# Patient Record
Sex: MALE | Race: WHITE | NOT HISPANIC OR LATINO | Employment: OTHER | URBAN - METROPOLITAN AREA
[De-identification: names, ages, dates, MRNs, and addresses within clinical notes are randomized per-mention and may not be internally consistent; named-entity substitution may affect disease eponyms.]

---

## 2019-06-12 ENCOUNTER — OFFICE VISIT (OUTPATIENT)
Dept: PODIATRY | Facility: CLINIC | Age: 74
End: 2019-06-12
Payer: MEDICARE

## 2019-06-12 VITALS
RESPIRATION RATE: 18 BRPM | OXYGEN SATURATION: 96 % | HEIGHT: 70 IN | TEMPERATURE: 98 F | HEART RATE: 61 BPM | DIASTOLIC BLOOD PRESSURE: 74 MMHG | BODY MASS INDEX: 26.2 KG/M2 | WEIGHT: 183 LBS | SYSTOLIC BLOOD PRESSURE: 121 MMHG

## 2019-06-12 DIAGNOSIS — L03.031 PARONYCHIA OF GREAT TOE OF RIGHT FOOT: ICD-10-CM

## 2019-06-12 DIAGNOSIS — L60.0 INGROWN NAIL: Primary | ICD-10-CM

## 2019-06-12 PROCEDURE — 99999 PR PBB SHADOW E&M-NEW PATIENT-LVL III: CPT | Mod: PBBFAC,,, | Performed by: PODIATRIST

## 2019-06-12 PROCEDURE — 99202 PR OFFICE/OUTPT VISIT, NEW, LEVL II, 15-29 MIN: ICD-10-PCS | Mod: S$PBB,,, | Performed by: PODIATRIST

## 2019-06-12 PROCEDURE — 99202 OFFICE O/P NEW SF 15 MIN: CPT | Mod: S$PBB,,, | Performed by: PODIATRIST

## 2019-06-12 PROCEDURE — 99999 PR PBB SHADOW E&M-NEW PATIENT-LVL III: ICD-10-PCS | Mod: PBBFAC,,, | Performed by: PODIATRIST

## 2019-06-12 PROCEDURE — 99203 OFFICE O/P NEW LOW 30 MIN: CPT | Mod: PBBFAC | Performed by: PODIATRIST

## 2019-06-12 RX ORDER — TELMISARTAN 40 MG/1
TABLET ORAL
Refills: 3 | COMMUNITY
Start: 2019-04-26

## 2019-06-12 RX ORDER — LATANOPROST 50 UG/ML
SOLUTION/ DROPS OPHTHALMIC
Refills: 3 | COMMUNITY
Start: 2019-05-29

## 2019-06-12 RX ORDER — CITALOPRAM 20 MG/1
20 TABLET, FILM COATED ORAL DAILY
Refills: 3 | COMMUNITY
Start: 2019-05-27

## 2019-06-12 RX ORDER — ATORVASTATIN CALCIUM 20 MG/1
TABLET, FILM COATED ORAL
Refills: 3 | COMMUNITY
Start: 2019-05-03

## 2019-06-12 RX ORDER — LEVOTHYROXINE SODIUM 25 UG/1
25 TABLET ORAL DAILY
Refills: 2 | COMMUNITY
Start: 2019-06-06

## 2019-06-15 PROBLEM — L60.0 INGROWN NAIL: Status: ACTIVE | Noted: 2019-06-15

## 2019-06-15 PROBLEM — L03.031 PARONYCHIA OF GREAT TOE OF RIGHT FOOT: Status: ACTIVE | Noted: 2019-06-15

## 2019-06-16 NOTE — PROGRESS NOTES
Subjective:       Patient ID: Curtis Gross is a 74 y.o. male.    Chief Complaint: Ingrown Toenail (right foot great toe)    HPI patient presents today with a complaint of an infected ingrown toenail on his right great toe he states he thinks it may have been caused by some closed in shoes that he wore the put pressure on the area.  Patient states he has had ingrown toenails in the past however he has been able to trim them himself.  Review of Systems   All other systems reviewed and are negative.      Objective:      Physical Exam   Constitutional: He appears well-developed and well-nourished.   Cardiovascular:   Pulses:       Dorsalis pedis pulses are 1+ on the right side, and 1+ on the left side.        Posterior tibial pulses are 1+ on the right side, and 1+ on the left side.   Pulmonary/Chest: Effort normal.   Musculoskeletal: Normal range of motion.   Feet:   Right Foot:   Protective Sensation: 4 sites tested. 4 sites sensed.   Skin Integrity: Positive for erythema and warmth.   Left Foot:   Protective Sensation: 4 sites tested. 4 sites sensed.   Neurological: He is alert.   Skin: Skin is warm. Capillary refill takes 2 to 3 seconds. There is erythema.   Psychiatric: He has a normal mood and affect. His behavior is normal. Judgment and thought content normal.   Nursing note and vitals reviewed.          Assessment:       1. Ingrown nail    2. Paronychia of great toe of right foot        Plan:       Patient presents today for new patient evaluation complete new patient exam performed patient's complaint is an ingrown toenail on his right great toe he has had this from time to time but he is easily been able to trim it he states this time it has become much worse he believes that was caused by a closed in shoe that he wore.  Patient has positive erythema positive edema medial border right hallux additionally patient has signs of fungal involvement in the nail this is likely a contributing factor to the ingrown  toenail and infection that he has today.  I was able to aggressively debride and remove the ingrowing nail from both the medial lateral border of the right hallux patient noted immediate relief I have recommended application of Vicks vapor rub twice a day every day this will soften the nail and address the fungal infection make it easier to trim I have advised the patient he needs to keep the corners of these nails trimmed out properly to prevent this again in the future.  Patient advised if he is not completely pain-free over the next 4-5 days to contact me for follow-up further evaluation and treatment I will see the patient as needed for follow-up.

## 2024-06-19 ENCOUNTER — PATIENT MESSAGE (OUTPATIENT)
Dept: NEUROLOGY | Facility: CLINIC | Age: 79
End: 2024-06-19
Payer: MEDICARE

## 2024-06-19 ENCOUNTER — TELEPHONE (OUTPATIENT)
Dept: NEUROLOGY | Facility: CLINIC | Age: 79
End: 2024-06-19
Payer: MEDICARE

## 2024-06-19 NOTE — TELEPHONE ENCOUNTER
Called patient at 6/19/24 on 2:08pm about him wanting to see the doctor he asked me to wait a moment for his wife to come back. I inform the two that all new patients seeing the doctor must have a 40 min virtual visit. Both understand, the wife asked on how to do a virtual visit I told her I will send instructions on how to do so. Also I told her I will find the number to call tech support for the virtual visit if it is not working. The appointment was make on August 26th at 1:20pm

## 2024-08-26 ENCOUNTER — OFFICE VISIT (OUTPATIENT)
Dept: NEUROLOGY | Facility: CLINIC | Age: 79
End: 2024-08-26
Payer: MEDICARE

## 2024-08-26 DIAGNOSIS — R26.89 SHUFFLING GAIT: Primary | ICD-10-CM

## 2024-08-26 PROCEDURE — 99204 OFFICE O/P NEW MOD 45 MIN: CPT | Mod: 95,,, | Performed by: PSYCHIATRY & NEUROLOGY

## 2024-08-26 NOTE — PROGRESS NOTES
The patient location is: home  The chief complaint leading to consultation is: iPD  Visit type: audiovisual  Total time spent with patient: 30mins  Each patient to whom he or she provides medical services by telemedicine is:  (1) informed of the relationship between the physician and patient and the respective role of any other health care provider with respect to management of the patient; and (2) notified that he or she may decline to receive medical services by telemedicine and may withdraw from such care at any time.    Notes: below    MOVEMENT DISORDERS CLINIC NEW VIDEO CONSULT NOTE    PCP/Referring Provider: Eduin Hinton, DPM  [address] [facility] [place] [id number]  Date of Service: 8/26/2024    Chief Complaint: iPD    HPI: Curtis Gross is a R HANDED 79 y.o. male with a medical issues significant for HTN, HL, Thyroid dz, ankle and b/l shoulder surgery, dz with ipD based on a POS datscan and change in gait after a covid booster over a year ago. He first felt imbalance that got better over time. He notices he shuffles but does not specifically drag one foot.     Had a DATScan but was on citalopram  Started carbidopa/levodopa 25/100mg 1 tab PO TID and gait somewhat better    Never had a tremor.    Wife is in PT    Neuroleptic exposure:  None    Current Living Situation: home    PD Review of Symptoms:  Anosmia: yes  Dysarthria/Hypophonia: none  Dysphagia/Sialorrhea: none  Depression: yes - labile  Cognitive slowing: word winding  Hallucinations: none  Impulsivity: yes  Urinary changes: none  Constipation: none  Orthostasis: none  Falls: none  Micrographia: yes  Sleep issues:  -RBD: none    Review of Systems:   Review of Systems   Constitutional:  Negative for fever.   HENT:  Negative for congestion.    Eyes:  Negative for double vision.   Respiratory:  Negative for cough and shortness of breath.    Cardiovascular:  Negative for chest pain and leg swelling.    Gastrointestinal:  Negative for nausea.   Genitourinary:  Negative for dysuria.   Musculoskeletal:  Positive for falls.   Skin:  Negative for rash.   Neurological:  Positive for tremors and speech change. Negative for headaches.   Psychiatric/Behavioral:  Negative for depression.          Current Medications:  Outpatient Encounter Medications as of 8/26/2024   Medication Sig Dispense Refill    atorvastatin (LIPITOR) 20 MG tablet   3    citalopram (CELEXA) 20 MG tablet Take 20 mg by mouth once daily.  3    latanoprost 0.005 % ophthalmic solution INSTILL 1 DROP INTO EACH EYE AT BEDTIME  3    levothyroxine (SYNTHROID) 25 MCG tablet Take 25 mcg by mouth once daily.  2    telmisartan (MICARDIS) 40 MG Tab   3     No facility-administered encounter medications on file as of 8/26/2024.       Past Medical History:  Patient Active Problem List   Diagnosis    Ingrown nail    Paronychia of great toe of right foot    Shuffling gait       Past Surgical History:  No past surgical history on file.    Social:  Social History     Socioeconomic History    Marital status:    Tobacco Use    Smoking status: Never    Smokeless tobacco: Never   Substance and Sexual Activity    Alcohol use: Not Currently    Drug use: Never    Sexual activity: Yes     Partners: Female     Social Determinants of Health     Financial Resource Strain: Low Risk  (8/23/2024)    Overall Financial Resource Strain (CARDIA)     Difficulty of Paying Living Expenses: Not very hard   Food Insecurity: No Food Insecurity (8/23/2024)    Hunger Vital Sign     Worried About Running Out of Food in the Last Year: Never true     Ran Out of Food in the Last Year: Never true   Physical Activity: Inactive (8/23/2024)    Exercise Vital Sign     Days of Exercise per Week: 0 days     Minutes of Exercise per Session: 0 min   Stress: No Stress Concern Present (8/23/2024)    [employer] [place] [employer] - Occupational Stress Questionnaire     Feeling of Stress :  Only a little   Housing Stability: Unknown (8/23/2024)    Housing Stability Vital Sign     Unable to Pay for Housing in the Last Year: No       Family History:  No family history on file.    PHYSICAL:  There were no vitals taken for this visit.    Physical Exam  Constitutional: Well-developed, well-nourished, appears stated age  Eyes: No scleral icterus  ENT: Moist oral mucosa  Cardiovascular: No lower extremity edema   Respiratory: No labored breathing   Skin: No rash   Hematologic: No bruising  Other: GI/ deferred   Mental status: Alert and oriented to person, place, time, and situation;   Speech: normal (not dysarthric), no aphasia  Cranial nerves:            CN II: Pupils mid-position and equal, not tested light or accommodation  CN III, IV, VI: Extraocular movements full, no nystagmus visualized  CN V: Not tested   CN VII: Face strong and symmetric bilaterally   CN VIII: Hearing intact to voice and conversation   CN IX, X: Palate raises midline and symmetric   CN XI: Strong shoulder shrug B/L  CN XII: Tongue appears midline   Motor: Normal bulk by appearance, no drift   Sensory: Not tested    Gait: Not tested  Deep tendon reflexes: Not tested  Movement/Coordination                    No hypophonic speech.                     No facial masking.  No bradykinesia.                    Does not swing R shoulder      Bradykinesia  ? 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

## 2024-08-26 NOTE — ASSESSMENT & PLAN NOTE
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

## 2024-09-04 ENCOUNTER — TELEPHONE (OUTPATIENT)
Dept: NEUROLOGY | Facility: CLINIC | Age: 79
End: 2024-09-04
Payer: MEDICARE

## 2024-09-04 NOTE — TELEPHONE ENCOUNTER
Called patient today about scheduling there next in person visit with the doctor. Patient  the call. I offer an appointment on December 19th at 2:20pm. Patient has agreed to the date and time.

## 2024-09-10 ENCOUNTER — CLINICAL SUPPORT (OUTPATIENT)
Dept: REHABILITATION | Facility: HOSPITAL | Age: 79
End: 2024-09-10
Attending: PSYCHIATRY & NEUROLOGY
Payer: MEDICARE

## 2024-09-10 DIAGNOSIS — Z74.09 IMPAIRED FUNCTIONAL MOBILITY, BALANCE, GAIT, AND ENDURANCE: Primary | ICD-10-CM

## 2024-09-10 DIAGNOSIS — R26.89 SHUFFLING GAIT: ICD-10-CM

## 2024-09-10 PROCEDURE — 97161 PT EVAL LOW COMPLEX 20 MIN: CPT | Mod: PN

## 2024-09-10 NOTE — PLAN OF CARE
"[facility]   Physical Therapy Initial Evaluation      Name: Curtis Gross  Clinic Number: [id number]    Therapy Diagnosis:   Encounter Diagnoses   Name Primary?    Shuffling gait     Impaired functional mobility, balance, gait, and endurance Yes        Physician: Lluvia Rodríguez MD    Physician Orders: PT Eval and Treat   Medical Diagnosis from Referral:    R26.89 (ICD-10-CM) - Shuffling gait     Evaluation Date: 9/10/2024  Authorization Period Expiration: 12/31/2024  Plan of Care Expiration: 12/10/2024  Progress Note Due: 10/15/2024  Date of Surgery: n/a  Visit # / Visits authorized: 1/ 1   FOTO: 1/ 3    Precautions: Standard     Time In: 9:30 am   Time Out: 10:26 am   Total Billable Time: 60 minutes    Subjective     Date of onset: patient stated he was diagnosed with Parkinson a couple of months ago.     History of current condition - Curtis reports: "My wife is a PT and she is constantly talking to me about shuffling my feet, and she wanted me to come and get some therapy".    Patient stated that he shuffles at times, has noted some difficulty with his writing - trouble holding a regular pen at times, has started to write small, and has noted some word finding difficulty and fears he might have early signs of dementia.   He said that he forgets things, but often times remembers what he was thinking about or wanting to say.  He has history of failed Right shoulder surgery from years ago, has limited mobility in this UE - cannot lift arm above 90 degrees.      Falls: Reports no falls     Imaging: : none on file     Prior Therapy: No previous therapy   Social History: lives with spouse; condo - steps (21) to get into condo ; does have lift, but takes the stairs daily for exercise.   Occupation: retired [employer]   Prior Level of Function: Independent;   Current Level of Function: Independent with mobility and ADL's; 2-3 years has not really done any dedicated exercise; Wants to start being " able to walk the dogs - but is concerned about his balance as the dogs are large. He does take the stairs in his condo for some exercise.   Right shoulder surgery 25 years ago - has ROM issues;     Pain:  Current 0/10, worst 3/10, best 0/10   Location: right  shoulder   Description: Tight  Aggravating Factors: using his arm for activities - cannot reach overhead or behind his back with his RUE.   Easing Factors:  rest     Patients goals: To improve gait and balance      Medical History:   No past medical history on file.    Surgical History:   Curtis Gross  has no past surgical history on file.    Medications:   Curtis has a current medication list which includes the following prescription(s): atorvastatin, citalopram, latanoprost, levothyroxine, and telmisartan.    Allergies:   Review of patient's allergies indicates:  No Known Allergies     Objective      Observation: Curtis drove himself to PT; He is alert/oriented x 4; Answers all questions appropriately; Follows all commands; Speech is clear and fluent, volume is good. Affect is normal        Posture: Standing: Slight forward head, protracted/depressed right shoulder;       Sitting: Slouch sits     Range of Motion:   UEs Right UE is limited to 90 deg flexion; unable to get hand behind head or hand behind his back;    Left UE: WFL   Les WFL bilaterally; does have some tightness in achilles, hamstrings     Upper Extremity Strength  RUE 3/5 within available ROM   LUE 5/5       functional;      Lower Extremity Strength  RLE Hip: flexion 5/5; Abd 4/5; Add 4/5; Ext: 4/5; IR/ER: 4/5; Knee: 4+/5; Ankle: 4/5   LLE Hip: flexion 5/5; Abd 4-/5; Add 4-/5; Ext 4-/5; IR/ER 4/5; Knee 4+/5; Ankle 4/5     Functional mobility:    Gait: Patient ambulating without use of any AD; good pace, adequate foot clearance noted over 300ft distance on tile floor; turns ok  Noted, no RUE arm swing with gait - patient stated that he is aware of this, just doesn't move his right arm much  "due to shoulder pain.     TUG: ave of 3 trials 10.25 secs; no LOB, or instability noted;   Steps:  up/down with use of single railing x 5 without limitation   SLS: able to lift up opposite foot, but cannot sustain greater than 3 sec without UE assist   Tandem Stance: able to perform and hold x 10 sec; longer with UE assist   Tandem Walk: able to perform x 8ft x 4 Independently   Toe-Taps: 6" step: catching of right foot multiple times, LOB noted, but able to self correct - performed on firm surface   Static stand: narrow/wide AMARJIT - eyes open - 60 secs without LOB; EC: 30 sec with increased sway, but no LOB   Static stand on Air-Ex : narrow AMARJIT: Eyes open - 60 sec; EC 15 sec with increased sway an LOB     Transfers: Independent sit <> stand, Sit <> supine; Supine <> prone       Sensation: Intact to light touch BUE/BLE     Flexibility:     Hamstrings = R moderate restriction, L moderate restriction   Norberto : R minimal restriction, L minimal restriction     PT Evaluation Completed? Yes  Discussed Plan of Care with patient: Yes     Intake Outcome Measure for FOTO LE without Knee Survey    Therapist reviewed FOTO scores for Curtis Gross on 9/10/2024.   FOTO report - see Media section or FOTO account episode details.    Intake Score: 51 %           Patient Education and Home Exercises     Education provided:   - Role of PT; POC - focus on gait, balance, flexibility and LE strength     Written Home Exercises Provided: Will develop over next few visits.  Curtis demonstrated good  understanding of the education provided.     Assessment     Curtis is a 79 y.o. male referred to outpatient Physical Therapy with a medical diagnosis of Shuffling gait as result of Parkinson's diagnosis . Patient presents with Decreased foot clearance that increases fall risk, impaired LE strength; impaired use of RUE which impacts his gait/balance and reaction time as well as overall reduced functional mobility with deficits in gait/balance and " "endurance for higher level activity. Patient will benefit from Skilled Physical Therapy Intervention to address deficits noted above to maximize patient's independent function and decrease fall risk.     Patient prognosis is Good.   Patient will benefit from skilled outpatient Physical Therapy to address the deficits stated above and in the chart below, provide patient /family education, and to maximize patientt's level of independence.     Plan of care discussed with patient: Yes  Patient's spiritual, cultural and educational needs considered and patient is agreeable to the plan of care and goals as stated below:     Anticipated Barriers for therapy: none     Medical Necessity is demonstrated by the following  History  Co-morbidities and personal factors that may impact the plan of care [] LOW: no personal factors / co-morbidities  [x] MODERATE: 1-2 personal factors / co-morbidities  [] HIGH: 3+ personal factors / co-morbidities    Moderate / High Support Documentation:   Co-morbidities affecting plan of care: Parkinsons, Right shoulder loss of mobility;     Personal Factors:   coping style  lifestyle     Examination  Body Structures and Functions, activity limitations and participation restrictions that may impact the plan of care [x] LOW: addressing 1-2 elements  [] MODERATE: 3+ elements  [] HIGH: 4+ elements (please support below)    Moderate / High Support Documentation: n/a      Clinical Presentation [x] LOW: stable  [] MODERATE: Evolving  [] HIGH: Unstable     Decision Making/ Complexity Score: low       Goals:  Short Term Goals: 3 weeks   Demonstrate improvement in recent symptoms to progress toward long term goals   Correct sitting/standing postural deficits to reduce fall risk/pain and improve postural awareness for injury prevention   Demonstrate compliance with initial exercise program     Long Term Goals: 6 weeks   Able to perform all household tasks without limitation   Able to perform Toe-Taps on 6" " bench with full clearance of feet for 2 mins   Able to perform SLS x 15 sec without UE assist   Able to ambulate 1 mile without limitation   LE strength improved by 1/2 grade   FOTO score improved to > 60   Independent with HEP for continued improvement in function     Plan     Plan of care Certification: 9/10/2024 to 12/10/2024.    Outpatient Physical Therapy 2 times weekly for 6 weeks to include the following interventions: Gait Training, Manual Therapy, Neuromuscular Re-ed, Patient Education, Therapeutic Activities, and Therapeutic Exercise.     Stefani Winter, ELBA        Physician's Signature: _________________________________________ Date: ________________

## 2024-09-11 NOTE — PLAN OF CARE
PT met face to face with Jerald Smith PTA to discuss patient's treatment plan and progress towards established goals.  Treatment will be continued as described in initial report/eval and progress notes.  Patient will be seen by physical therapist every sixth visit and minimally once per month.    Additional information: recent Parkinson's diagnosis; sedentary lifestyle over past couple of years; has decreased foot clearance with LOB on Toe-Tap; reduced function Right shoulder x 25 years; mild strength decline in LE's;

## 2024-09-16 ENCOUNTER — CLINICAL SUPPORT (OUTPATIENT)
Dept: REHABILITATION | Facility: HOSPITAL | Age: 79
End: 2024-09-16
Payer: MEDICARE

## 2024-09-16 DIAGNOSIS — Z74.09 IMPAIRED FUNCTIONAL MOBILITY, BALANCE, GAIT, AND ENDURANCE: Primary | ICD-10-CM

## 2024-09-16 PROCEDURE — 97110 THERAPEUTIC EXERCISES: CPT | Mod: PN,CQ

## 2024-09-16 PROCEDURE — 97112 NEUROMUSCULAR REEDUCATION: CPT | Mod: PN,CQ

## 2024-09-16 NOTE — PROGRESS NOTES
[facility]   Physical Therapy Treatment Note      Name: Curtis Gross  Clinic Number: [id number]    Therapy Diagnosis:   Encounter Diagnosis   Name Primary?    Impaired functional mobility, balance, gait, and endurance Yes     Physician: Lluvia Rodríguez MD    Visit Date: 9/16/2024    Physician Orders: PT Eval and Treat   Medical Diagnosis from Referral:    R26.89 (ICD-10-CM) - Shuffling gait      Evaluation Date: 9/10/2024  Authorization Period Expiration: 12/31/2024  Plan of Care Expiration: 12/10/2024  Progress Note Due: 10/15/2024  Date of Surgery: n/a  Visit # / Visits authorized: 1/ 12 + eval   FOTO: 1/ 3     Precautions: Standard      Time In: 9:45 am   Time Out: 10:25 am   Total Billable Time: 40 minutes    PTA Visit #: 1/5       Subjective     Patient reports: no new complaints.  He was compliant with home exercise program.  Response to previous treatment: ok  Functional change: none    Pain: 0/10  Location: right shoulder      Objective      Objective Measures updated at progress report unless specified.     Treatment     Curtis received the treatments listed below:      therapeutic exercises to develop strength, and range of motion for 25 minutes including:  NuStep level 4 x 15 min  Supine SAQ x 2 min  Supine bridges x 10  SLR 2 x 10  Ball squeeze x 2 min    manual therapy techniques: Myofacial release and Soft tissue Mobilization were applied to the: back for 0 minutes, including:      neuromuscular re-education activities to improve: Balance, Coordination, and Proprioception for 15 minutes. The following activities were included:  Toe taps x 2 min  FSU on 6 inch step x 10  LSU on 6 inch step x 10  Heel raises x 15  Wedge stretch 3 x 30 sec    therapeutic activities to improve functional performance for 0  minutes, including:      Patient Education and Home Exercises       Education provided:   - therapeutic exercise    Written Home Exercises Provided: Pt instructed to  "continue prior HEP. Exercises were reviewed and Curtis was able to demonstrate them prior to the end of the session.  Curtis demonstrated good  understanding of the education provided. See Electronic Medical Record under Patient Instructions for exercises provided during therapy sessions    Assessment     Curtis is a 79 y.o. male referred to outpatient Physical Therapy with a medical diagnosis of Shuffling gait as result of Parkinson's diagnosis . Patient presents with Decreased foot clearance that increases fall risk, impaired LE strength; impaired use of RUE which impacts his gait/balance and reaction time as well as overall reduced functional mobility with deficits in gait/balance and endurance for higher level activity.      Curtis Is progressing well towards his goals.   Patient prognosis is Good.     Patient will continue to benefit from skilled outpatient physical therapy to address the deficits listed in the problem list box on initial evaluation, provide pt/family education and to maximize pt's level of independence in the home and community environment.     Patient's spiritual, cultural and educational needs considered and pt agreeable to plan of care and goals.     Anticipated barriers to physical therapy: none    Goals:   Short Term Goals: 3 weeks   Demonstrate improvement in recent symptoms to progress toward long term goals   Correct sitting/standing postural deficits to reduce fall risk/pain and improve postural awareness for injury prevention   Demonstrate compliance with initial exercise program      Long Term Goals: 6 weeks   Able to perform all household tasks without limitation   Able to perform Toe-Taps on 6" bench with full clearance of feet for 2 mins   Able to perform SLS x 15 sec without UE assist   Able to ambulate 1 mile without limitation   LE strength improved by 1/2 grade   FOTO score improved to > 60   Independent with HEP for continued improvement in function     Plan     Plan of care " Certification: 9/10/2024 to 12/10/2024.     Outpatient Physical Therapy 2 times weekly for 6 weeks to include the following interventions: Gait Training, Manual Therapy, Neuromuscular Re-ed, Patient Education, Therapeutic Activities, and Therapeutic Exercise.     Jerald Smith, PTA

## 2024-09-19 ENCOUNTER — CLINICAL SUPPORT (OUTPATIENT)
Dept: REHABILITATION | Facility: HOSPITAL | Age: 79
End: 2024-09-19
Payer: MEDICARE

## 2024-09-19 DIAGNOSIS — Z74.09 IMPAIRED FUNCTIONAL MOBILITY, BALANCE, GAIT, AND ENDURANCE: Primary | ICD-10-CM

## 2024-09-19 PROCEDURE — 97112 NEUROMUSCULAR REEDUCATION: CPT | Mod: PN,CQ

## 2024-09-19 PROCEDURE — 97110 THERAPEUTIC EXERCISES: CPT | Mod: PN,CQ

## 2024-09-19 NOTE — PROGRESS NOTES
[facility]   Physical Therapy Treatment Note      Name: Curtis Gross  Clinic Number: [id number]    Therapy Diagnosis:   Encounter Diagnosis   Name Primary?    Impaired functional mobility, balance, gait, and endurance Yes     Physician: Lluvia Rodríguez MD    Visit Date: 9/19/2024    Physician Orders: PT Eval and Treat   Medical Diagnosis from Referral:    R26.89 (ICD-10-CM) - Shuffling gait      Evaluation Date: 9/10/2024  Authorization Period Expiration: 12/31/2024  Plan of Care Expiration: 12/10/2024  Progress Note Due: 10/15/2024  Date of Surgery: n/a  Visit # / Visits authorized: 2/ 12 + eval   FOTO: 1/ 3     Precautions: Standard      Time In: 9:30 am   Time Out: 10:10 am   Total Billable Time: 40 minutes    PTA Visit #: 2/5       Subjective     Patient reports: feeling good after last treatment.  He was compliant with home exercise program.  Response to previous treatment: ok  Functional change: none    Pain: 0/10  Location: right shoulder      Objective      Objective Measures updated at progress report unless specified.     Treatment     Curtis received the treatments listed below:      therapeutic exercises to develop strength, and range of motion for 25 minutes including:  NuStep level 5 x 15 min  Supine SAQ  2,5#  x 2 min  Supine bridges x 10  SLR 2 x 10  Ball squeeze x 2 min    manual therapy techniques: Myofacial release and Soft tissue Mobilization were applied to the: back for 0 minutes, including:      neuromuscular re-education activities to improve: Balance, Coordination, and Proprioception for 15 minutes. The following activities were included:  Toe taps x 2 min  FSU on 6 inch step x 15  LSU on 6 inch step x 15  Heel raises x 15  Wedge stretch 3 x 30 sec    therapeutic activities to improve functional performance for 0  minutes, including:      Patient Education and Home Exercises       Education provided:   - therapeutic exercise    Written Home Exercises Provided:  "Pt instructed to continue prior HEP. Exercises were reviewed and Curtis was able to demonstrate them prior to the end of the session.  Curtis demonstrated good  understanding of the education provided. See Electronic Medical Record under Patient Instructions for exercises provided during therapy sessions    Assessment     Curtis is a 79 y.o. male referred to outpatient Physical Therapy with a medical diagnosis of Shuffling gait as result of Parkinson's diagnosis . Patient presents with Decreased foot clearance that increases fall risk, impaired LE strength; impaired use of RUE which impacts his gait/balance and reaction time as well as overall reduced functional mobility with deficits in gait/balance and endurance for higher level activity.      Curtis Is progressing well towards his goals.   Patient prognosis is Good.     Patient will continue to benefit from skilled outpatient physical therapy to address the deficits listed in the problem list box on initial evaluation, provide pt/family education and to maximize pt's level of independence in the home and community environment.     Patient's spiritual, cultural and educational needs considered and pt agreeable to plan of care and goals.     Anticipated barriers to physical therapy: none    Goals:   Short Term Goals: 3 weeks   Demonstrate improvement in recent symptoms to progress toward long term goals   Correct sitting/standing postural deficits to reduce fall risk/pain and improve postural awareness for injury prevention   Demonstrate compliance with initial exercise program      Long Term Goals: 6 weeks   Able to perform all household tasks without limitation   Able to perform Toe-Taps on 6" bench with full clearance of feet for 2 mins   Able to perform SLS x 15 sec without UE assist   Able to ambulate 1 mile without limitation   LE strength improved by 1/2 grade   FOTO score improved to > 60   Independent with HEP for continued improvement in function     Plan "     Plan of care Certification: 9/10/2024 to 12/10/2024.     Outpatient Physical Therapy 2 times weekly for 6 weeks to include the following interventions: Gait Training, Manual Therapy, Neuromuscular Re-ed, Patient Education, Therapeutic Activities, and Therapeutic Exercise.     Jerald Smith, PTA

## 2024-09-23 ENCOUNTER — CLINICAL SUPPORT (OUTPATIENT)
Dept: REHABILITATION | Facility: HOSPITAL | Age: 79
End: 2024-09-23
Payer: MEDICARE

## 2024-09-23 DIAGNOSIS — Z74.09 IMPAIRED FUNCTIONAL MOBILITY, BALANCE, GAIT, AND ENDURANCE: Primary | ICD-10-CM

## 2024-09-23 PROCEDURE — 97110 THERAPEUTIC EXERCISES: CPT | Mod: PN,CQ

## 2024-09-23 PROCEDURE — 97112 NEUROMUSCULAR REEDUCATION: CPT | Mod: PN,CQ

## 2024-09-23 NOTE — PROGRESS NOTES
"[facility]   Physical Therapy Treatment Note      Name: Curtis Gross  Clinic Number: [id number]    Therapy Diagnosis:   Encounter Diagnosis   Name Primary?    Impaired functional mobility, balance, gait, and endurance Yes     Physician: Lluvia Rodríguez MD    Visit Date: 9/23/2024    Physician Orders: PT Eval and Treat   Medical Diagnosis from Referral:    R26.89 (ICD-10-CM) - Shuffling gait      Evaluation Date: 9/10/2024  Authorization Period Expiration: 12/31/2024  Plan of Care Expiration: 12/10/2024  Progress Note Due: 10/15/2024  Date of Surgery: n/a  Visit # / Visits authorized: 3 / 12 + eval   FOTO: 1/ 3     Precautions: Standard      Time In: 8:35 AM  Time Out: 9:20 AM  Total Billable Time: 40 minutes    PTA Visit #: 3/5       Subjective     Patient reports: No new c/o's.   He was compliant with home exercise program.  Response to previous treatment: ok  Functional change: none    Pain: 0/10  Location: right shoulder      Objective      Objective Measures updated at progress report unless specified.     Treatment     Curtis received the treatments listed below:      therapeutic exercises to develop strength, and range of motion for 25 minutes including:  NuStep level 5 x 16 min  Standing Hip Flex/Abd/Ext x 10  Supine SAQ  2.5# x 2 min  Supine bridges x 15  SLR 2 x 10  Ball squeeze x 2 min  LAQ x 15 w/ 2.5#    manual therapy techniques: Myofacial release and Soft tissue Mobilization were applied to the: back for 0 minutes, including:      neuromuscular re-education activities to improve: Balance, Coordination, and Proprioception for 15 minutes. The following activities were included:  Toe taps on 6" step x 2 min  FSU on 6" step x 15  LSU on 6" step x 15  Heel raises x 15  Wedge stretch 3 x 30 sec    therapeutic activities to improve functional performance for 0  minutes, including:      Patient Education and Home Exercises       Education provided:   - therapeutic " "exercise    Written Home Exercises Provided: Pt instructed to continue prior HEP. Exercises were reviewed and Curtis was able to demonstrate them prior to the end of the session.  Curtis demonstrated good  understanding of the education provided. See Electronic Medical Record under Patient Instructions for exercises provided during therapy sessions    Assessment     Curtis is a 79 y.o. male referred to outpatient Physical Therapy with a medical diagnosis of Shuffling gait as result of Parkinson's diagnosis . Patient presents with Decreased foot clearance that increases fall risk, impaired LE strength; impaired use of RUE which impacts his gait/balance and reaction time as well as overall reduced functional mobility with deficits in gait/balance and endurance for higher level activity.      Curtis Is progressing well towards his goals.   Patient prognosis is Good.     Patient will continue to benefit from skilled outpatient physical therapy to address the deficits listed in the problem list box on initial evaluation, provide pt/family education and to maximize pt's level of independence in the home and community environment.     Patient's spiritual, cultural and educational needs considered and pt agreeable to plan of care and goals.     Anticipated barriers to physical therapy: none    Goals:   Short Term Goals: 3 weeks   Demonstrate improvement in recent symptoms to progress toward long term goals   Correct sitting/standing postural deficits to reduce fall risk/pain and improve postural awareness for injury prevention   Demonstrate compliance with initial exercise program      Long Term Goals: 6 weeks   Able to perform all household tasks without limitation   Able to perform Toe-Taps on 6" bench with full clearance of feet for 2 mins   Able to perform SLS x 15 sec without UE assist   Able to ambulate 1 mile without limitation   LE strength improved by 1/2 grade   FOTO score improved to > 60   Independent with HEP " for continued improvement in function     Plan     Plan of care Certification: 9/10/2024 to 12/10/2024.     Outpatient Physical Therapy 2 times weekly for 6 weeks to include the following interventions: Gait Training, Manual Therapy, Neuromuscular Re-ed, Patient Education, Therapeutic Activities, and Therapeutic Exercise.     Jonathan Favre, PTA

## 2024-09-26 ENCOUNTER — CLINICAL SUPPORT (OUTPATIENT)
Dept: REHABILITATION | Facility: HOSPITAL | Age: 79
End: 2024-09-26
Payer: MEDICARE

## 2024-09-26 DIAGNOSIS — Z74.09 IMPAIRED FUNCTIONAL MOBILITY, BALANCE, GAIT, AND ENDURANCE: Primary | ICD-10-CM

## 2024-09-26 PROCEDURE — 97110 THERAPEUTIC EXERCISES: CPT | Mod: PN,CQ

## 2024-09-26 PROCEDURE — 97112 NEUROMUSCULAR REEDUCATION: CPT | Mod: PN,CQ

## 2024-09-26 NOTE — PROGRESS NOTES
"[facility]   Physical Therapy Treatment Note      Name: Curtis Gross  Clinic Number: [id number]    Therapy Diagnosis:   Encounter Diagnosis   Name Primary?    Impaired functional mobility, balance, gait, and endurance Yes     Physician: Lluvia Rodríguez MD    Visit Date: 9/26/2024    Physician Orders: PT Eval and Treat   Medical Diagnosis from Referral:    R26.89 (ICD-10-CM) - Shuffling gait      Evaluation Date: 9/10/2024  Authorization Period Expiration: 12/31/2024  Plan of Care Expiration: 12/10/2024  Progress Note Due: 10/15/2024  Date of Surgery: n/a  Visit # / Visits authorized: 4 / 12 + eval   FOTO: 1/ 3     Precautions: Standard      Time In: 8:35 AM  Time Out: 9:20 AM  Total Billable Time: 40 minutes    PTA Visit #: 4/5       Subjective     Patient reports: No new c/o's.   He was compliant with home exercise program.  Response to previous treatment: ok  Functional change: none    Pain: 0/10  Location: right shoulder      Objective      Objective Measures updated at progress report unless specified.     Treatment     Curtis received the treatments listed below:      therapeutic exercises to develop strength, and range of motion for 25 minutes including:  NuStep level 5 x 15 min  Standing Hip Flex/Abd/Ext x 15  Supine SAQ  2.5# x 2 min  Supine bridges x 15  SLR 2 x 10  Ball squeeze x 2 min  LAQ x 15 w/ 2.5#  Squats x15- VC's for technique    manual therapy techniques: Myofacial release and Soft tissue Mobilization were applied to the: back for 0 minutes, including:      neuromuscular re-education activities to improve: Balance, Coordination, and Proprioception for 15 minutes. The following activities were included:  Toe taps on 6" step x 2 min  FSU on 6" step x 15  LSU on 6" step x 15  Heel raises x 15  Wedge stretch 3 x 30 sec    therapeutic activities to improve functional performance for 0  minutes, including:      Patient Education and Home Exercises       Education " "provided:   - therapeutic exercise    Written Home Exercises Provided: Pt instructed to continue prior HEP. Exercises were reviewed and Curtis was able to demonstrate them prior to the end of the session.  Curtis demonstrated good  understanding of the education provided. See Electronic Medical Record under Patient Instructions for exercises provided during therapy sessions    Assessment     Curtis is a 79 y.o. male referred to outpatient Physical Therapy with a medical diagnosis of Shuffling gait as result of Parkinson's diagnosis . Patient presents with Decreased foot clearance that increases fall risk, impaired LE strength; impaired use of RUE which impacts his gait/balance and reaction time as well as overall reduced functional mobility with deficits in gait/balance and endurance for higher level activity.      Curtis Is progressing well towards his goals.   Patient prognosis is Good.     Patient will continue to benefit from skilled outpatient physical therapy to address the deficits listed in the problem list box on initial evaluation, provide pt/family education and to maximize pt's level of independence in the home and community environment.     Patient's spiritual, cultural and educational needs considered and pt agreeable to plan of care and goals.     Anticipated barriers to physical therapy: none    Goals:   Short Term Goals: 3 weeks   Demonstrate improvement in recent symptoms to progress toward long term goals   Correct sitting/standing postural deficits to reduce fall risk/pain and improve postural awareness for injury prevention   Demonstrate compliance with initial exercise program      Long Term Goals: 6 weeks   Able to perform all household tasks without limitation   Able to perform Toe-Taps on 6" bench with full clearance of feet for 2 mins   Able to perform SLS x 15 sec without UE assist   Able to ambulate 1 mile without limitation   LE strength improved by 1/2 grade   FOTO score improved to > " 60   Independent with HEP for continued improvement in function     Plan     Plan of care Certification: 9/10/2024 to 12/10/2024.     Outpatient Physical Therapy 2 times weekly for 6 weeks to include the following interventions: Gait Training, Manual Therapy, Neuromuscular Re-ed, Patient Education, Therapeutic Activities, and Therapeutic Exercise.     Jonathan Favre, PTA

## 2024-09-30 ENCOUNTER — CLINICAL SUPPORT (OUTPATIENT)
Dept: REHABILITATION | Facility: HOSPITAL | Age: 79
End: 2024-09-30
Payer: MEDICARE

## 2024-09-30 DIAGNOSIS — Z74.09 IMPAIRED FUNCTIONAL MOBILITY, BALANCE, GAIT, AND ENDURANCE: Primary | ICD-10-CM

## 2024-09-30 PROCEDURE — 97110 THERAPEUTIC EXERCISES: CPT | Mod: PN,CQ

## 2024-09-30 PROCEDURE — 97112 NEUROMUSCULAR REEDUCATION: CPT | Mod: PN,CQ

## 2024-09-30 NOTE — PROGRESS NOTES
"[facility]   Physical Therapy Treatment Note      Name: Curtis Gross  Clinic Number: [id number]    Therapy Diagnosis:   Encounter Diagnosis   Name Primary?    Impaired functional mobility, balance, gait, and endurance Yes     Physician: Lluvia Rodríguez MD    Visit Date: 9/30/2024    Physician Orders: PT Eval and Treat   Medical Diagnosis from Referral:    R26.89 (ICD-10-CM) - Shuffling gait      Evaluation Date: 9/10/2024  Authorization Period Expiration: 12/31/2024  Plan of Care Expiration: 12/10/2024  Progress Note Due: 10/15/2024  Date of Surgery: n/a  Visit # / Visits authorized: 4 / 12 + eval   FOTO: 1/ 3     Precautions: Standard      Time In: 9:35 AM  Time Out: 10:15 AM  Total Billable Time: 40 minutes    PTA Visit #: 5/5       Subjective     Patient reports: some improvement in balance.  He was compliant with home exercise program.  Response to previous treatment: ok  Functional change: none    Pain: 0/10  Location: right shoulder      Objective      Objective Measures updated at progress report unless specified.     Treatment     Curtis received the treatments listed below:      therapeutic exercises to develop strength, and range of motion for 25 minutes including:  NuStep level 5 x 15 min  Standing Hip Flex/Abd/Ext with red t tube x 10-15  Supine SAQ  2.5# x 2 min  Supine bridges x 15  SLR 2 x 10  Ball squeeze x 2 min  LAQ x 15 w/ 2.5#  Squats x15- VC's for technique    manual therapy techniques: Myofacial release and Soft tissue Mobilization were applied to the: back for 0 minutes, including:      neuromuscular re-education activities to improve: Balance, Coordination, and Proprioception for 15 minutes. The following activities were included:  Toe taps on 6" step x 2 min  FSU on 6" step x 15  LSU on 6" step x 15  Heel raises x 15  Wedge stretch 3 x 30 sec    therapeutic activities to improve functional performance for 0  minutes, including:      Patient Education and Home " Exercises       Education provided:   - therapeutic exercise    Written Home Exercises Provided: Pt instructed to continue prior HEP. Exercises were reviewed and Curtis was able to demonstrate them prior to the end of the session.  Curtis demonstrated good  understanding of the education provided. See Electronic Medical Record under Patient Instructions for exercises provided during therapy sessions    Assessment   Patient is doing very well.  He still requires verbal cues on his exercise.  Added red thera tube to his 3 way hip exercises and reduced the reps to 10.  He said he felt good after treatment.    Curtis is a 79 y.o. male referred to outpatient Physical Therapy with a medical diagnosis of Shuffling gait as result of Parkinson's diagnosis . Patient presents with Decreased foot clearance that increases fall risk, impaired LE strength; impaired use of RUE which impacts his gait/balance and reaction time as well as overall reduced functional mobility with deficits in gait/balance and endurance for higher level activity.      Curtis Is progressing well towards his goals.   Patient prognosis is Good.     Patient will continue to benefit from skilled outpatient physical therapy to address the deficits listed in the problem list box on initial evaluation, provide pt/family education and to maximize pt's level of independence in the home and community environment.     Patient's spiritual, cultural and educational needs considered and pt agreeable to plan of care and goals.     Anticipated barriers to physical therapy: none    Goals:   Short Term Goals: 3 weeks   Demonstrate improvement in recent symptoms to progress toward long term goals   Correct sitting/standing postural deficits to reduce fall risk/pain and improve postural awareness for injury prevention   Demonstrate compliance with initial exercise program      Long Term Goals: 6 weeks   Able to perform all household tasks without limitation   Able to perform  "Toe-Taps on 6" bench with full clearance of feet for 2 mins   Able to perform SLS x 15 sec without UE assist   Able to ambulate 1 mile without limitation   LE strength improved by 1/2 grade   FOTO score improved to > 60   Independent with HEP for continued improvement in function     Plan     Plan of care Certification: 9/10/2024 to 12/10/2024.     Outpatient Physical Therapy 2 times weekly for 6 weeks to include the following interventions: Gait Training, Manual Therapy, Neuromuscular Re-ed, Patient Education, Therapeutic Activities, and Therapeutic Exercise.     Jerald Smith, PTA             "

## 2024-10-03 ENCOUNTER — CLINICAL SUPPORT (OUTPATIENT)
Dept: REHABILITATION | Facility: HOSPITAL | Age: 79
End: 2024-10-03
Payer: MEDICARE

## 2024-10-03 DIAGNOSIS — Z74.09 IMPAIRED FUNCTIONAL MOBILITY, BALANCE, GAIT, AND ENDURANCE: Primary | ICD-10-CM

## 2024-10-03 PROCEDURE — 97112 NEUROMUSCULAR REEDUCATION: CPT | Mod: PN

## 2024-10-03 PROCEDURE — 97110 THERAPEUTIC EXERCISES: CPT | Mod: PN

## 2024-10-03 NOTE — PROGRESS NOTES
"[facility]   Physical Therapy Treatment Note      Name: Curtis Gross  Clinic Number: [id number]    Therapy Diagnosis:   Encounter Diagnosis   Name Primary?    Impaired functional mobility, balance, gait, and endurance Yes     Physician: Lluvia Rodríguez MD    Visit Date: 10/3/2024    Physician Orders: PT Eval and Treat   Medical Diagnosis from Referral:    R26.89 (ICD-10-CM) - Shuffling gait      Evaluation Date: 9/10/2024  Authorization Period Expiration: 12/31/2024  Plan of Care Expiration: 12/10/2024  Progress Note Due: 10/15/2024  Date of Surgery: n/a  Visit # / Visits authorized: 4 / 12 + eval   FOTO: 1/ 3     Precautions: Standard      Time In: 7:50 AM  Time Out: 8:45 AM  Total Billable Time: 45 minutes    PTA Visit #: 0/5       Subjective     Patient reports: some improvement in balance.  He was compliant with home exercise program.  Response to previous treatment: ok  Functional change: none    Pain: 0/10  Location: right shoulder      Objective      Objective Measures updated at progress report unless specified.     Treatment     Curtis received the treatments listed below:      therapeutic exercises to develop strength, and range of motion for 20 minutes including:  NuStep level 5 x 15 min  Standing Hip Flex/Abd/Ext with green  t tube x 10-15  Supine SAQ  2.5# x 2 min  Supine bridges x 15  SLR 2 x 10  off of gray SB   DKC with gray SB - cueing for control x 2 min  Ball squeeze x 2 min  LAQ x 15 w/ 2.5#  Squats x15- VC's for technique    manual therapy techniques: Myofacial release and Soft tissue Mobilization were applied to the: back for 0 minutes, including:      neuromuscular re-education activities to improve: Balance, Coordination, and Proprioception for 25 minutes. The following activities were included:  Toe taps on 8" step x 2 min - Air-Ex mat   FSU on 6" step x 15  LSU on 6" step x 15  Heel raises x 15  Wedge stretch 3 x 30 sec  Sitting on blue SB:  - required " tactile cueing for balance/trunk posture   Anterior/posterior pelvic tilt x 10  Alternate arm lifts - flexion/abd to 90 deg x 20  Alternate hip flex - small amplitude x 20  Alternate LAQ's x 20   Same side arm/leg lifts x 20     therapeutic activities to improve functional performance for 0  minutes, including:      Patient Education and Home Exercises       Education provided:   - therapeutic exercise    Written Home Exercises Provided: Pt instructed to continue prior HEP. Exercises were reviewed and Curtis was able to demonstrate them prior to the end of the session.  Curtis demonstrated good  understanding of the education provided. See Electronic Medical Record under Patient Instructions for exercises provided during therapy sessions    Assessment   Patient reports noting improvement in his balance and overall mobility at home; Advanced balanced activities today to work on core mm activation for better trunk control on unstable surfaces;  He still requires occasional verbal cues on his exercise - primarily for control of movements.     Curtis is a 79 y.o. male referred to outpatient Physical Therapy with a medical diagnosis of Shuffling gait as result of Parkinson's diagnosis . Patient presents with Decreased foot clearance that increases fall risk, impaired LE strength; impaired use of RUE which impacts his gait/balance and reaction time as well as overall reduced functional mobility with deficits in gait/balance and endurance for higher level activity.      Curtis Is progressing well towards his goals.   Patient prognosis is Good.     Patient will continue to benefit from skilled outpatient physical therapy to address the deficits listed in the problem list box on initial evaluation, provide pt/family education and to maximize pt's level of independence in the home and community environment.     Patient's spiritual, cultural and educational needs considered and pt agreeable to plan of care and goals.    "  Anticipated barriers to physical therapy: none    Goals:   Short Term Goals: 3 weeks   Demonstrate improvement in recent symptoms to progress toward long term goals  > MET   Correct sitting/standing postural deficits to reduce fall risk/pain and improve postural awareness for injury prevention  > MET   Demonstrate compliance with initial exercise program  > MET      Long Term Goals: 6 weeks   Able to perform all household tasks without limitation   Able to perform Toe-Taps on 6" bench with full clearance of feet for 2 mins   Able to perform SLS x 15 sec without UE assist   Able to ambulate 1 mile without limitation   LE strength improved by 1/2 grade   FOTO score improved to > 60   Independent with HEP for continued improvement in function     Plan     Plan of care Certification: 9/10/2024 to 12/10/2024.     Outpatient Physical Therapy 2 times weekly for 6 weeks to include the following interventions: Gait Training, Manual Therapy, Neuromuscular Re-ed, Patient Education, Therapeutic Activities, and Therapeutic Exercise.     Stefani Winter, PT               "

## 2024-10-07 ENCOUNTER — CLINICAL SUPPORT (OUTPATIENT)
Dept: REHABILITATION | Facility: HOSPITAL | Age: 79
End: 2024-10-07
Payer: MEDICARE

## 2024-10-07 DIAGNOSIS — Z74.09 IMPAIRED FUNCTIONAL MOBILITY, BALANCE, GAIT, AND ENDURANCE: Primary | ICD-10-CM

## 2024-10-07 PROCEDURE — 97112 NEUROMUSCULAR REEDUCATION: CPT | Mod: KX,PN,CQ

## 2024-10-07 PROCEDURE — 97110 THERAPEUTIC EXERCISES: CPT | Mod: KX,PN,CQ

## 2024-10-07 NOTE — PROGRESS NOTES
"[facility]   Physical Therapy Treatment Note      Name: Curtis Gross  Clinic Number: [id number]    Therapy Diagnosis:   Encounter Diagnosis   Name Primary?    Impaired functional mobility, balance, gait, and endurance Yes     Physician: Lluvia Rodríguez MD    Visit Date: 10/7/2024    Physician Orders: PT Eval and Treat   Medical Diagnosis from Referral:    R26.89 (ICD-10-CM) - Shuffling gait      Evaluation Date: 9/10/2024  Authorization Period Expiration: 12/31/2024  Plan of Care Expiration: 12/10/2024  Progress Note Due: 10/15/2024  Date of Surgery: n/a  Visit # / Visits authorized: 7 / 12 + eval   FOTO: 1/ 3     Precautions: Standard      Time In: 930  Time Out: 1015  Total Billable Time: 45 minutes    PTA Visit #: 1/5       Subjective     Patient reports: getting stronger and noting improvement in balance.  He was compliant with home exercise program.  Response to previous treatment: ok  Functional change: none    Pain: 0/10  Location: right shoulder      Objective      Objective Measures updated at progress report unless specified.     Treatment     Curtis received the treatments listed below:      therapeutic exercises to develop strength, and range of motion for 20 minutes including:  NuStep level 5 x 15 min  Standing Hip Flex/Abd/Ext with green  t tube x 10-15  Supine SAQ  2.5# x 2 min  Supine bridges x 15  SLR 2 x 10  off of gray SB   DKC with gray SB - cueing for control x 2 min  Ball squeeze x 2 min  LAQ x 15 w/ 2.5#  Squats x15- VC's for technique    manual therapy techniques: Myofacial release and Soft tissue Mobilization were applied to the: back for 0 minutes, including:      neuromuscular re-education activities to improve: Balance, Coordination, and Proprioception for 25 minutes. The following activities were included:  Toe taps on 8" step x 2 min - Air-Ex mat   FSU on 6" step x 15  LSU on 6" step x 15  Heel raises x 15  Wedge stretch 3 x 30 sec  Sitting on blue SB:  - " required tactile cueing for balance/trunk posture   Anterior/posterior pelvic tilt x 10  Alternate arm lifts - flexion/abd to 90 deg x 20  Alternate hip flex - small amplitude x 20  Dead bugs x 10  Alternate LAQ's x 20   Same side arm/leg lifts x 20     therapeutic activities to improve functional performance for 0  minutes, including:      Patient Education and Home Exercises       Education provided:   - therapeutic exercise    Written Home Exercises Provided: Pt instructed to continue prior HEP. Exercises were reviewed and Curtis was able to demonstrate them prior to the end of the session.  Curtis demonstrated good  understanding of the education provided. See Electronic Medical Record under Patient Instructions for exercises provided during therapy sessions    Assessment   Patient reports noting improvement in his balance and overall mobility at home; Advanced balanced activities today to work on core mm activation for better trunk control on unstable surfaces;  He still requires occasional verbal cues on his exercise - primarily for control of movements.     Curtis is a 79 y.o. male referred to outpatient Physical Therapy with a medical diagnosis of Shuffling gait as result of Parkinson's diagnosis . Patient presents with Decreased foot clearance that increases fall risk, impaired LE strength; impaired use of RUE which impacts his gait/balance and reaction time as well as overall reduced functional mobility with deficits in gait/balance and endurance for higher level activity.      Curtis Is progressing well towards his goals.   Patient prognosis is Good.     Patient will continue to benefit from skilled outpatient physical therapy to address the deficits listed in the problem list box on initial evaluation, provide pt/family education and to maximize pt's level of independence in the home and community environment.     Patient's spiritual, cultural and educational needs considered and pt agreeable to plan of  "care and goals.     Anticipated barriers to physical therapy: none    Goals:   Short Term Goals: 3 weeks   Demonstrate improvement in recent symptoms to progress toward long term goals  > MET   Correct sitting/standing postural deficits to reduce fall risk/pain and improve postural awareness for injury prevention  > MET   Demonstrate compliance with initial exercise program  > MET      Long Term Goals: 6 weeks   Able to perform all household tasks without limitation   Able to perform Toe-Taps on 6" bench with full clearance of feet for 2 mins   Able to perform SLS x 15 sec without UE assist   Able to ambulate 1 mile without limitation   LE strength improved by 1/2 grade   FOTO score improved to > 60   Independent with HEP for continued improvement in function     Plan     Plan of care Certification: 9/10/2024 to 12/10/2024.     Outpatient Physical Therapy 2 times weekly for 6 weeks to include the following interventions: Gait Training, Manual Therapy, Neuromuscular Re-ed, Patient Education, Therapeutic Activities, and Therapeutic Exercise.     Jerald Smith, CARROL                 "

## 2024-10-10 ENCOUNTER — CLINICAL SUPPORT (OUTPATIENT)
Dept: REHABILITATION | Facility: HOSPITAL | Age: 79
End: 2024-10-10
Payer: MEDICARE

## 2024-10-10 DIAGNOSIS — Z74.09 IMPAIRED FUNCTIONAL MOBILITY, BALANCE, GAIT, AND ENDURANCE: Primary | ICD-10-CM

## 2024-10-10 PROCEDURE — 97112 NEUROMUSCULAR REEDUCATION: CPT | Mod: KX,PN,CQ

## 2024-10-10 PROCEDURE — 97110 THERAPEUTIC EXERCISES: CPT | Mod: KX,PN,CQ

## 2024-10-10 NOTE — PROGRESS NOTES
"[facility]   Physical Therapy Treatment Note      Name: Curtis Gross  Clinic Number: [id number]    Therapy Diagnosis:   Encounter Diagnosis   Name Primary?    Impaired functional mobility, balance, gait, and endurance Yes     Physician: Lluvia Rodríguez MD    Visit Date: 10/10/2024    Physician Orders: PT Eval and Treat   Medical Diagnosis from Referral:    R26.89 (ICD-10-CM) - Shuffling gait      Evaluation Date: 9/10/2024  Authorization Period Expiration: 12/31/2024  Plan of Care Expiration: 12/10/2024  Progress Note Due: 10/15/2024  Date of Surgery: n/a  Visit # / Visits authorized: 8 / 12 + eval   FOTO: 1/ 3     Precautions: Standard      Time In: 945  Time Out: 1030  Total Billable Time: 45 minutes    PTA Visit #: 1/5       Subjective     Patient reports: getting stronger and noting improvement in balance.  He was compliant with home exercise program.  Response to previous treatment: ok  Functional change: none    Pain: 0/10  Location: right shoulder      Objective      Objective Measures updated at progress report unless specified.     Treatment     Curtis received the treatments listed below:      therapeutic exercises to develop strength, and range of motion for 30 minutes including:  NuStep level 5 x 15 min  Standing Hip Flex/Abd/Ext with green  t tube x 10-15  Supine SAQ  2.5# x 2 min  Supine bridges x 15  SLR 2 x 10  off of gray SB   DKC with gray SB - cueing for control x 2 min  Ball squeeze x 2 min  LAQ x 15 w/ 2.5#  Squats x15- VC's for technique    manual therapy techniques: Myofacial release and Soft tissue Mobilization were applied to the: back for 0 minutes, including:      neuromuscular re-education activities to improve: Balance, Coordination, and Proprioception for 15 minutes. The following activities were included:  Toe taps on 8" step x 2 min - Air-Ex mat   FSU on 6" step x 15  LSU on 6" step x 15  Heel raises x 15  Wedge stretch 3 x 30 sec  Sitting on blue SB:  " - required tactile cueing for balance/trunk posture   Anterior/posterior pelvic tilt x 10  Alternate arm lifts - flexion/abd to 90 deg x 20  Alternate hip flex - small amplitude x 20  Dead bugs x 10  Alternate LAQ's x 20   Same side arm/leg lifts x 20     therapeutic activities to improve functional performance for 0  minutes, including:      Patient Education and Home Exercises       Education provided:   - therapeutic exercise    Written Home Exercises Provided: Pt instructed to continue prior HEP. Exercises were reviewed and Curtis was able to demonstrate them prior to the end of the session.  Curtis demonstrated good  understanding of the education provided. See Electronic Medical Record under Patient Instructions for exercises provided during therapy sessions    Assessment   Patient reports noting improvement in his balance and overall mobility at home; Advanced balanced activities today to work on core mm activation for better trunk control on unstable surfaces;  He still requires occasional verbal cues on his exercise - primarily for control of movements.     Curtis is a 79 y.o. male referred to outpatient Physical Therapy with a medical diagnosis of Shuffling gait as result of Parkinson's diagnosis . Patient presents with Decreased foot clearance that increases fall risk, impaired LE strength; impaired use of RUE which impacts his gait/balance and reaction time as well as overall reduced functional mobility with deficits in gait/balance and endurance for higher level activity.      Curtis Is progressing well towards his goals.   Patient prognosis is Good.     Patient will continue to benefit from skilled outpatient physical therapy to address the deficits listed in the problem list box on initial evaluation, provide pt/family education and to maximize pt's level of independence in the home and community environment.     Patient's spiritual, cultural and educational needs considered and pt agreeable to plan  "of care and goals.     Anticipated barriers to physical therapy: none    Goals:   Short Term Goals: 3 weeks   Demonstrate improvement in recent symptoms to progress toward long term goals  > MET   Correct sitting/standing postural deficits to reduce fall risk/pain and improve postural awareness for injury prevention  > MET   Demonstrate compliance with initial exercise program  > MET      Long Term Goals: 6 weeks   Able to perform all household tasks without limitation   Able to perform Toe-Taps on 6" bench with full clearance of feet for 2 mins   Able to perform SLS x 15 sec without UE assist   Able to ambulate 1 mile without limitation   LE strength improved by 1/2 grade   FOTO score improved to > 60   Independent with HEP for continued improvement in function     Plan     Plan of care Certification: 9/10/2024 to 12/10/2024.     Outpatient Physical Therapy 2 times weekly for 6 weeks to include the following interventions: Gait Training, Manual Therapy, Neuromuscular Re-ed, Patient Education, Therapeutic Activities, and Therapeutic Exercise.     Jerald Smith, PTA                   "

## 2024-10-14 ENCOUNTER — CLINICAL SUPPORT (OUTPATIENT)
Dept: REHABILITATION | Facility: HOSPITAL | Age: 79
End: 2024-10-14
Payer: MEDICARE

## 2024-10-14 DIAGNOSIS — Z74.09 IMPAIRED FUNCTIONAL MOBILITY, BALANCE, GAIT, AND ENDURANCE: Primary | ICD-10-CM

## 2024-10-14 PROCEDURE — 97110 THERAPEUTIC EXERCISES: CPT | Mod: KX,PN,CQ

## 2024-10-14 PROCEDURE — 97112 NEUROMUSCULAR REEDUCATION: CPT | Mod: KX,PN,CQ

## 2024-10-14 NOTE — PROGRESS NOTES
"[facility]   Physical Therapy Treatment Note      Name: Curtis Gross  Clinic Number: [id number]    Therapy Diagnosis:   Encounter Diagnosis   Name Primary?    Impaired functional mobility, balance, gait, and endurance Yes     Physician: Lluvia Rodríguez MD    Visit Date: 10/14/2024    Physician Orders: PT Eval and Treat   Medical Diagnosis from Referral:    R26.89 (ICD-10-CM) - Shuffling gait      Evaluation Date: 9/10/2024  Authorization Period Expiration: 12/31/2024  Plan of Care Expiration: 12/10/2024  Progress Note Due: 10/15/2024  Date of Surgery: n/a  Visit # / Visits authorized: 9 / 12 + eval   FOTO: 1/ 3     Precautions: Standard      Time In: 935  Time Out: 1015  Total Billable Time: 45 minutes    PTA Visit #: 3/5       Subjective     Patient reports: that therapy is helping.  He was compliant with home exercise program.  Response to previous treatment: ok  Functional change: none    Pain: 0/10  Location: right shoulder      Objective      Objective Measures updated at progress report unless specified.     Treatment     Curtis received the treatments listed below:      therapeutic exercises to develop strength, and range of motion for 30 minutes including:  NuStep level 5 x 15 min  Standing Hip Flex/Abd/Ext with green  t tube x 10-15  Supine SAQ  2.5# x 2 min  Supine bridges x 15  SLR 2 x 10  off of gray SB   DKC with gray SB - cueing for control x 2 min  Ball squeeze x 2 min  LAQ x 15 w/ 2.5#  Squats x15- VC's for technique    manual therapy techniques: Myofacial release and Soft tissue Mobilization were applied to the: back for 0 minutes, including:      neuromuscular re-education activities to improve: Balance, Coordination, and Proprioception for 15 minutes. The following activities were included:  Toe taps on 8" step x 2 min - Air-Ex mat   FSU on 6" step x 15  LSU on 6" step x 15  Heel raises x 15  Wedge stretch 3 x 30 sec  Sitting on blue SB:  - required tactile cueing " for balance/trunk posture   Anterior/posterior pelvic tilt x 10  Alternate arm lifts - flexion/abd to 90 deg x 20  Alternate hip flex - small amplitude x 20  Dead bugs x 10  Alternate LAQ's x 20   Same side arm/leg lifts x 20     therapeutic activities to improve functional performance for 0  minutes, including:      Patient Education and Home Exercises       Education provided:   - therapeutic exercise    Written Home Exercises Provided: Pt instructed to continue prior HEP. Exercises were reviewed and Curtis was able to demonstrate them prior to the end of the session.  Curtis demonstrated good  understanding of the education provided. See Electronic Medical Record under Patient Instructions for exercises provided during therapy sessions    Assessment   Patient reports noting improvement in his balance and overall mobility at home; Advanced balanced activities today to work on core mm activation for better trunk control on unstable surfaces;  He still requires occasional verbal cues on his exercise - primarily for control of movements.     Curtis is a 79 y.o. male referred to outpatient Physical Therapy with a medical diagnosis of Shuffling gait as result of Parkinson's diagnosis . Patient presents with Decreased foot clearance that increases fall risk, impaired LE strength; impaired use of RUE which impacts his gait/balance and reaction time as well as overall reduced functional mobility with deficits in gait/balance and endurance for higher level activity.      Curtis Is progressing well towards his goals.   Patient prognosis is Good.     Patient will continue to benefit from skilled outpatient physical therapy to address the deficits listed in the problem list box on initial evaluation, provide pt/family education and to maximize pt's level of independence in the home and community environment.     Patient's spiritual, cultural and educational needs considered and pt agreeable to plan of care and goals.    "  Anticipated barriers to physical therapy: none    Goals:   Short Term Goals: 3 weeks   Demonstrate improvement in recent symptoms to progress toward long term goals  > MET   Correct sitting/standing postural deficits to reduce fall risk/pain and improve postural awareness for injury prevention  > MET   Demonstrate compliance with initial exercise program  > MET      Long Term Goals: 6 weeks   Able to perform all household tasks without limitation   Able to perform Toe-Taps on 6" bench with full clearance of feet for 2 mins   Able to perform SLS x 15 sec without UE assist   Able to ambulate 1 mile without limitation   LE strength improved by 1/2 grade   FOTO score improved to > 60   Independent with HEP for continued improvement in function     Plan     Plan of care Certification: 9/10/2024 to 12/10/2024.     Outpatient Physical Therapy 2 times weekly for 6 weeks to include the following interventions: Gait Training, Manual Therapy, Neuromuscular Re-ed, Patient Education, Therapeutic Activities, and Therapeutic Exercise.     Jerald Smith, PTA                     "

## 2024-10-16 ENCOUNTER — CLINICAL SUPPORT (OUTPATIENT)
Dept: REHABILITATION | Facility: HOSPITAL | Age: 79
End: 2024-10-16
Payer: MEDICARE

## 2024-10-16 DIAGNOSIS — Z74.09 IMPAIRED FUNCTIONAL MOBILITY, BALANCE, GAIT, AND ENDURANCE: Primary | ICD-10-CM

## 2024-10-16 PROCEDURE — 97112 NEUROMUSCULAR REEDUCATION: CPT | Mod: KX,PN

## 2024-10-16 PROCEDURE — 97110 THERAPEUTIC EXERCISES: CPT | Mod: KX,PN

## 2024-10-16 NOTE — PROGRESS NOTES
"[facility]   Physical Therapy Treatment Note      Name: Curtis Gross  Clinic Number: [id number]    Therapy Diagnosis:   Encounter Diagnosis   Name Primary?    Impaired functional mobility, balance, gait, and endurance Yes     Physician: Lluvia Rodríguez MD    Visit Date: 10/16/2024    Physician Orders: PT Eval and Treat   Medical Diagnosis from Referral:    R26.89 (ICD-10-CM) - Shuffling gait      Evaluation Date: 9/10/2024  Authorization Period Expiration: 12/31/2024  Plan of Care Expiration: 12/10/2024  Progress Note Due: 10/15/2024  Date of Surgery: n/a  Visit # / Visits authorized: 10 / 12 + eval   FOTO: 1/ 3     Precautions: Standard      Time In: 8:45 am   Time Out:   9:30 am    Total Billable Time: 45 minutes    PTA Visit #: 0/5       Subjective     Patient reports: that therapy is helping.  He was compliant with home exercise program.  Response to previous treatment: ok  Functional change: feels is balance is better, less "tipping forward"     Pain: 0/10  Location: right shoulder      Objective      Objective Measures updated at progress report unless specified.     Treatment     Curtis received the treatments listed below:      therapeutic exercises to develop strength, and range of motion for 20 minutes including:  NuStep level 5 x 15 min  Standing Hip Flex/Abd/Ext with green  t tube x 10-15  Supine SAQ  2.5# x 2 min  Supine bridges x 15  SLR 2 x 10  off of gray SB   DKC with gray SB - cueing for control x 2 min  Ball squeeze x 2 min  LAQ x 15 w/ 2.5#  Squats x15- VC's for technique    manual therapy techniques: Myofacial release and Soft tissue Mobilization were applied to the: back for 0 minutes, including:      neuromuscular re-education activities to improve: Balance, Coordination, and Proprioception for 25 minutes. The following activities were included:  Toe taps on 8" step x 2 min - Air-Ex mat - no UE assist; cueing for control   SLS on Air-Ex: 60 sec each side: " "fingertips for balance   FSU on 6" step x 15 - cueing for control   LSU on 6" step x 15 - cueing for control   Heel raises x 15  Wedge stretch 3 x 30 sec  Standing on BOSU ball - flat side: find balance point and maintain with light HHA   Standing on BOSU ball flat side:  CGA for balance: ball toss back and forth to challenge balance x 3-4 mins   Standing: side to wall - trunk, head/arm rotation with red tband resistance x 10 on each side   Standing: wall push-up position: support plank position - alternate hip/knee flexion - knee toward wall x 20           Sitting on blue SB:  - required tactile cueing for balance/trunk posture   Anterior/posterior pelvic tilt x 10  Alternate arm lifts - flexion/abd to 90 deg x 20  Alternate hip flex - small amplitude x 20  Dead bugs x 10  Alternate LAQ's x 20   Same side arm/leg lifts x 20     therapeutic activities to improve functional performance for 0  minutes, including:      Patient Education and Home Exercises       Education provided:   - therapeutic exercise    Written Home Exercises Provided: Pt instructed to continue prior HEP. Exercises were reviewed and Curtis was able to demonstrate them prior to the end of the session.  Curtis demonstrated good  understanding of the education provided. See Electronic Medical Record under Patient Instructions for exercises provided during therapy sessions    Assessment   Patient reports noting improvement in his balance and overall mobility at home; Advanced balanced activities today to work on core mm activation for better trunk control on unstable surfaces;  He still requires occasional verbal cues on his exercise - primarily for control of movements.     Curtis is a 79 y.o. male referred to outpatient Physical Therapy with a medical diagnosis of Shuffling gait as result of Parkinson's diagnosis . Patient presents with Decreased foot clearance that increases fall risk, impaired LE strength; impaired use of RUE which impacts his " "gait/balance and reaction time as well as overall reduced functional mobility with deficits in gait/balance and endurance for higher level activity.      Curtis Is progressing well towards his goals.   Patient prognosis is Good.     Patient will continue to benefit from skilled outpatient physical therapy to address the deficits listed in the problem list box on initial evaluation, provide pt/family education and to maximize pt's level of independence in the home and community environment.     Patient's spiritual, cultural and educational needs considered and pt agreeable to plan of care and goals.     Anticipated barriers to physical therapy: none    Goals:   Short Term Goals: 3 weeks   Demonstrate improvement in recent symptoms to progress toward long term goals  > MET   Correct sitting/standing postural deficits to reduce fall risk/pain and improve postural awareness for injury prevention  > MET   Demonstrate compliance with initial exercise program  > MET      Long Term Goals: 6 weeks   Able to perform all household tasks without limitation  > MET   Able to perform Toe-Taps on 6" bench with full clearance of feet for 2 mins > Progressing   Able to perform SLS x 15 sec without UE assist  > Progressing   Able to ambulate 1 mile without limitation  > Progressing   LE strength improved by 1/2 grade   FOTO score improved to > 60   Independent with HEP for continued improvement in function     Plan     Plan of care Certification: 9/10/2024 to 12/10/2024.     Outpatient Physical Therapy 2 times weekly for 6 weeks to include the following interventions: Gait Training, Manual Therapy, Neuromuscular Re-ed, Patient Education, Therapeutic Activities, and Therapeutic Exercise.     Stefani Winter, PT                       "

## 2024-10-21 ENCOUNTER — CLINICAL SUPPORT (OUTPATIENT)
Dept: REHABILITATION | Facility: HOSPITAL | Age: 79
End: 2024-10-21
Payer: MEDICARE

## 2024-10-21 DIAGNOSIS — Z74.09 IMPAIRED FUNCTIONAL MOBILITY, BALANCE, GAIT, AND ENDURANCE: Primary | ICD-10-CM

## 2024-10-21 PROCEDURE — 97112 NEUROMUSCULAR REEDUCATION: CPT | Mod: PN,CQ

## 2024-10-21 PROCEDURE — 97110 THERAPEUTIC EXERCISES: CPT | Mod: PN,CQ

## 2024-10-21 NOTE — PROGRESS NOTES
"[facility]   Physical Therapy Treatment Note      Name: Curtis Gross  Clinic Number: [id number]    Therapy Diagnosis:   Encounter Diagnosis   Name Primary?    Impaired functional mobility, balance, gait, and endurance Yes     Physician: Lluvia Rodríguez MD    Visit Date: 10/21/2024    Physician Orders: PT Eval and Treat   Medical Diagnosis from Referral:    R26.89 (ICD-10-CM) - Shuffling gait      Evaluation Date: 9/10/2024  Authorization Period Expiration: 12/31/2024  Plan of Care Expiration: 12/10/2024  Progress Note Due: 10/15/2024  Date of Surgery: n/a  Visit # / Visits authorized: 11 / 12 + eval   FOTO: 1/ 3     Precautions: Standard      Time In: 9:45 am   Time Out:   10:30 am    Total Billable Time: 40 minutes    PTA Visit #: 1/5       Subjective     Patient reports: no new complaints.  He was compliant with home exercise program.  Response to previous treatment: ok  Functional change: feels is balance is better, less "tipping forward"     Pain: 0/10  Location: right shoulder      Objective      Objective Measures updated at progress report unless specified.     Treatment     Curtis received the treatments listed below:      therapeutic exercises to develop strength, and range of motion for 30 minutes including:  NuStep level 5 x 20 min  Standing Hip Flex/Abd/Ext with green  t tube x 10-15  Supine SAQ  2.5# x 2 min  Supine bridges x 15  SLR 2 x 10  off of gray SB   DKC with gray SB - cueing for control x 2 min  Ball squeeze x 2 min  LAQ x 15 w/ 2.5#  Squats x15- VC's for technique    manual therapy techniques: Myofacial release and Soft tissue Mobilization were applied to the: back for 0 minutes, including:      neuromuscular re-education activities to improve: Balance, Coordination, and Proprioception for 10 minutes. The following activities were included:  Toe taps on 8" step x 2 min - Air-Ex mat - no UE assist; cueing for control   SLS on Air-Ex: 60 sec each side: fingertips " "for balance   FSU on 6" step x 15 - cueing for control   LSU on 6" step x 15 - cueing for control   Heel raises x 15  Wedge stretch 3 x 30 sec  Standing on BOSU ball - flat side: find balance point and maintain with light HHA   Standing on BOSU ball flat side:  CGA for balance: ball toss back and forth to challenge balance x 3-4 mins   Standing: side to wall - trunk, head/arm rotation with red tband resistance x 10 on each side   Standing: wall push-up position: support plank position - alternate hip/knee flexion - knee toward wall x 20           Sitting on blue SB:  - required tactile cueing for balance/trunk posture   Anterior/posterior pelvic tilt x 10  Alternate arm lifts - flexion/abd to 90 deg x 20  Alternate hip flex - small amplitude x 20  Dead bugs x 10  Alternate LAQ's x 20   Same side arm/leg lifts x 20     therapeutic activities to improve functional performance for 0  minutes, including:      Patient Education and Home Exercises       Education provided:   - therapeutic exercise    Written Home Exercises Provided: Pt instructed to continue prior HEP. Exercises were reviewed and Curtis was able to demonstrate them prior to the end of the session.  Curtis demonstrated good  understanding of the education provided. See Electronic Medical Record under Patient Instructions for exercises provided during therapy sessions    Assessment   Patient reports noting improvement in his balance and overall mobility at home; Advanced balanced activities today to work on core mm activation for better trunk control on unstable surfaces;  He still requires occasional verbal cues on his exercise - primarily for control of movements.     Curtis is a 79 y.o. male referred to outpatient Physical Therapy with a medical diagnosis of Shuffling gait as result of Parkinson's diagnosis . Patient presents with Decreased foot clearance that increases fall risk, impaired LE strength; impaired use of RUE which impacts his gait/balance " "and reaction time as well as overall reduced functional mobility with deficits in gait/balance and endurance for higher level activity.      Curtis Is progressing well towards his goals.   Patient prognosis is Good.     Patient will continue to benefit from skilled outpatient physical therapy to address the deficits listed in the problem list box on initial evaluation, provide pt/family education and to maximize pt's level of independence in the home and community environment.     Patient's spiritual, cultural and educational needs considered and pt agreeable to plan of care and goals.     Anticipated barriers to physical therapy: none    Goals:   Short Term Goals: 3 weeks   Demonstrate improvement in recent symptoms to progress toward long term goals  > MET   Correct sitting/standing postural deficits to reduce fall risk/pain and improve postural awareness for injury prevention  > MET   Demonstrate compliance with initial exercise program  > MET      Long Term Goals: 6 weeks   Able to perform all household tasks without limitation  > MET   Able to perform Toe-Taps on 6" bench with full clearance of feet for 2 mins > Progressing   Able to perform SLS x 15 sec without UE assist  > Progressing   Able to ambulate 1 mile without limitation  > Progressing   LE strength improved by 1/2 grade   FOTO score improved to > 60   Independent with HEP for continued improvement in function     Plan     Plan of care Certification: 9/10/2024 to 12/10/2024.     Outpatient Physical Therapy 2 times weekly for 6 weeks to include the following interventions: Gait Training, Manual Therapy, Neuromuscular Re-ed, Patient Education, Therapeutic Activities, and Therapeutic Exercise.     Jerald Smith, CARROL                         "

## 2024-10-24 ENCOUNTER — CLINICAL SUPPORT (OUTPATIENT)
Dept: REHABILITATION | Facility: HOSPITAL | Age: 79
End: 2024-10-24
Payer: MEDICARE

## 2024-10-24 DIAGNOSIS — Z74.09 IMPAIRED FUNCTIONAL MOBILITY, BALANCE, GAIT, AND ENDURANCE: Primary | ICD-10-CM

## 2024-10-24 PROCEDURE — 97112 NEUROMUSCULAR REEDUCATION: CPT | Mod: KX,PN

## 2024-10-24 PROCEDURE — 97110 THERAPEUTIC EXERCISES: CPT | Mod: KX,PN

## 2024-10-24 NOTE — PROGRESS NOTES
"[facility]   Physical Therapy Treatment Note  / Discharge      Name: Curtis Gross  Clinic Number: [id number]    Therapy Diagnosis:   Encounter Diagnosis   Name Primary?    Impaired functional mobility, balance, gait, and endurance Yes     Physician: Lluvia Rodríguez MD    Visit Date: 10/24/2024    Physician Orders: PT Eval and Treat   Medical Diagnosis from Referral:    R26.89 (ICD-10-CM) - Shuffling gait      Evaluation Date: 9/10/2024  Authorization Period Expiration: 12/31/2024  Plan of Care Expiration: 12/10/2024  Progress Note Due: 11/30/2024  Date of Surgery: n/a  Visit # / Visits authorized: 12 / 12 + eval   FOTO: 1/ 3     Precautions: Standard      Time In:  8:45  am   Time Out:   9:30  am    Total Billable Time: 45 minutes    PTA Visit #: 0/5       Subjective     Patient reports: no new complaints.  He was compliant with home exercise program.  Response to previous treatment: ok  Functional change: feels is balance is better, less "tipping forward"     Pain: 0/10  Location: right shoulder      Objective      Objective Measures updated at progress report unless specified.   Sit <> Stand:  15 reps in 30 sec.   TUG: ave of 3 trials 10.25 secs; no LOB, or instability noted;   Steps:  up/down with use of single railing x 5 without limitation   SLS: able to lift up opposite foot, but cannot sustain greater than 3 sec without UE assist  15 sec Left         Right  22 sec   x 3 trials on each   Tandem Stance: able to perform and hold x 10 sec; longer with UE assist    60 sec without UE assist  right lateral trunk lean to maintain balance   Tandem Walk: able to perform x 8ft x 4 Independently   Toe-Taps: 6" step: catching of right foot multiple times, LOB noted, but able to self correct - performed on firm surface   Static stand: narrow/wide AMARJIT - eyes open - 60 secs without LOB; EC: 30 sec with increased sway, but no LOB   Static stand on Air-Ex : narrow AMARJIT: Eyes open - 60 sec; EC 15 " "sec with increased sway an LOB     Treatment     Curtis received the treatments listed below:      therapeutic exercises to develop strength, and range of motion for 30 minutes including:  NuStep level 5 x15  min  Standing Hip Flex/Abd/Ext with green  t tube x 10-15  Supine SAQ  2.5# x 2 min  Supine bridges x 15  SLR 2 x 10  off of gray SB   DKC with gray SB - cueing for control x 2 min  Ball squeeze x 2 min  LAQ x 15 w/ 2.5#  Squats x15- VC's for technique    manual therapy techniques: Myofacial release and Soft tissue Mobilization were applied to the: back for 0 minutes, including:      neuromuscular re-education activities to improve: Balance, Coordination, and Proprioception for 10 minutes. The following activities were included:  Toe taps on 8" step x 2 min - Air-Ex mat - no UE assist; cueing for control   SLS on Air-Ex: 60 sec each side: fingertips for balance   FSU on 6" step x 15 - cueing for control   LSU on 6" step x 15 - cueing for control   Heel raises x 15  Wedge stretch 3 x 30 sec  Standing on BOSU ball - flat side: find balance point and maintain with light HHA   Standing on BOSU ball flat side:  CGA for balance: ball toss back and forth to challenge balance x 3-4 mins   Standing: side to wall - trunk, head/arm rotation with red tband resistance x 10 on each side   Standing: wall push-up position: support plank position - alternate hip/knee flexion - knee toward wall x 20           Sitting on blue SB:  - required tactile cueing for balance/trunk posture   Anterior/posterior pelvic tilt x 10  Alternate arm lifts - flexion/abd to 90 deg x 20  Alternate hip flex - small amplitude x 20  Dead bugs x 10  Alternate LAQ's x 20   Same side arm/leg lifts x 20     therapeutic activities to improve functional performance for 0  minutes, including:      Patient Education and Home Exercises       Education provided:   -written HEP given to patient today   - therapeutic exercise    Written Home Exercises Provide  " "HEP for discahrge given to patient.  Exercises were reviewed and Curtis was able to demonstrate them prior to the end of the session.  Curtis demonstrated good  understanding of the education provided. See Electronic Medical Record under Patient Instructions for exercises provided during therapy sessions    Assessment   Patient reports noting improvement in his balance and overall mobility at home;  Patient ready for discharge from PT at this time.     Curtis is a 79 y.o. male referred to outpatient Physical Therapy with a medical diagnosis of Shuffling gait as result of Parkinson's diagnosis . Patient presents with Decreased foot clearance that increases fall risk, impaired LE strength; impaired use of RUE which impacts his gait/balance and reaction time as well as overall reduced functional mobility with deficits in gait/balance and endurance for higher level activity.      Curtis Is progressing well towards his goals.   Patient prognosis is Good.     Patient will continue to benefit from skilled outpatient physical therapy to address the deficits listed in the problem list box on initial evaluation, provide pt/family education and to maximize pt's level of independence in the home and community environment.     Patient's spiritual, cultural and educational needs considered and pt agreeable to plan of care and goals.     Anticipated barriers to physical therapy: none    Goals:   Short Term Goals: 3 weeks   Demonstrate improvement in recent symptoms to progress toward long term goals  > MET   Correct sitting/standing postural deficits to reduce fall risk/pain and improve postural awareness for injury prevention  > MET   Demonstrate compliance with initial exercise program  > MET      Long Term Goals: 6 weeks   Able to perform all household tasks without limitation  > MET   Able to perform Toe-Taps on 6" bench with full clearance of feet for 2 mins > Progressing   Able to perform SLS x 15 sec without UE assist  > " Progressing   Able to ambulate 1 mile without limitation  > Progressing   LE strength improved by 1/2 grade   FOTO score improved to > 60   Independent with HEP for continued improvement in function     Plan     Plan of care Certification: 9/10/2024 to 12/10/2024.     Outpatient Physical Therapy 2 times weekly for 6 weeks to include the following interventions: Gait Training, Manual Therapy, Neuromuscular Re-ed, Patient Education, Therapeutic Activities, and Therapeutic Exercise.     Stefani Winter, PT

## 2024-12-19 ENCOUNTER — PATIENT MESSAGE (OUTPATIENT)
Dept: NEUROLOGY | Facility: CLINIC | Age: 79
End: 2024-12-19

## 2024-12-19 ENCOUNTER — OFFICE VISIT (OUTPATIENT)
Dept: NEUROLOGY | Facility: CLINIC | Age: 79
End: 2024-12-19
Payer: MEDICARE

## 2024-12-19 VITALS
BODY MASS INDEX: 29.7 KG/M2 | WEIGHT: 207.44 LBS | SYSTOLIC BLOOD PRESSURE: 173 MMHG | DIASTOLIC BLOOD PRESSURE: 85 MMHG | HEIGHT: 70 IN | HEART RATE: 73 BPM

## 2024-12-19 DIAGNOSIS — G20.A2 PARKINSON'S DISEASE WITHOUT DYSKINESIA, WITH FLUCTUATING MANIFESTATIONS: Primary | ICD-10-CM

## 2024-12-19 DIAGNOSIS — R45.86 MOOD CHANGE: ICD-10-CM

## 2024-12-19 PROCEDURE — 99215 OFFICE O/P EST HI 40 MIN: CPT | Mod: S$PBB,,, | Performed by: PSYCHIATRY & NEUROLOGY

## 2024-12-19 PROCEDURE — 99213 OFFICE O/P EST LOW 20 MIN: CPT | Mod: PBBFAC | Performed by: PSYCHIATRY & NEUROLOGY

## 2024-12-19 PROCEDURE — 99999 PR PBB SHADOW E&M-EST. PATIENT-LVL III: CPT | Mod: PBBFAC,,, | Performed by: PSYCHIATRY & NEUROLOGY

## 2024-12-19 RX ORDER — DONEPEZIL HYDROCHLORIDE 5 MG/1
5 TABLET, FILM COATED ORAL NIGHTLY
COMMUNITY

## 2024-12-19 RX ORDER — MIRTAZAPINE 7.5 MG/1
7.5 TABLET, FILM COATED ORAL NIGHTLY
Qty: 30 TABLET | Refills: 11 | Status: SHIPPED | OUTPATIENT
Start: 2024-12-19 | End: 2025-12-19

## 2024-12-19 RX ORDER — CARBIDOPA AND LEVODOPA 25; 100 MG/1; MG/1
1.5 TABLET ORAL 3 TIMES DAILY
Qty: 90 TABLET | Refills: 12 | Status: SHIPPED | OUTPATIENT
Start: 2024-12-19

## 2024-12-19 RX ORDER — CARBIDOPA AND LEVODOPA 25; 100 MG/1; MG/1
1 TABLET ORAL 3 TIMES DAILY
COMMUNITY
End: 2024-12-19 | Stop reason: SDUPTHER

## 2024-12-19 NOTE — PROGRESS NOTES
[facility]    PCP/Referring Provider: No referring provider defined for this encounter.  Date of Service: 12/19/2024    Chief Complaint: iPD    Interval Hx    Since last visit,   Continues on carbidopa/levodopa 25/100mg 1 tab PO TID   Gait has declined somewhat R>L    PD Review of Symptoms:  Anosmia: yes  Dysarthria/Hypophonia: none  Dysphagia/Sialorrhea: none  Depression: yes - labile  Cognitive slowing: word winding  Hallucinations: none  Impulsivity: yes  Urinary changes: none  Constipation: none  Orthostasis: none  Falls: none  Micrographia: yes  Sleep issues:  -RBD: none    HPI: Curtis Gross is a R HANDED 79 y.o. male with a medical issues significant for HTN, HL, Thyroid dz, ankle and b/l shoulder surgery, dz with ipD based on a POS datscan and change in gait after a covid booster over a year ago. He first felt imbalance that got better over time. He notices he shuffles but does not specifically drag one foot.     Had a DATScan but was on citalopram  Started carbidopa/levodopa 25/100mg 1 tab PO TID and gait somewhat better    Never had a tremor.    Wife is in PT    Neuroleptic exposure:  None    Current Living Situation: home    PD Review of Symptoms:  Anosmia: yes  Dysarthria/Hypophonia: none  Dysphagia/Sialorrhea: none  Depression: yes - labile  Cognitive slowing: word winding  Hallucinations: none  Impulsivity: yes  Urinary changes: none  Constipation: none  Orthostasis: none  Falls: none  Micrographia: yes  Sleep issues:  -RBD: none    Review of Systems:   Review of Systems   Constitutional:  Negative for fever.   HENT:  Negative for congestion.    Eyes:  Negative for double vision.   Respiratory:  Negative for cough and shortness of breath.    Cardiovascular:  Negative for chest pain and leg swelling.   Gastrointestinal:  Negative for nausea.   Genitourinary:  Negative for dysuria.   Musculoskeletal:  Positive for falls.   Skin:  Negative for rash.   Neurological:  Positive for  tremors and speech change. Negative for headaches.   Psychiatric/Behavioral:  Negative for depression.          Current Medications:  Outpatient Encounter Medications as of 12/19/2024   Medication Sig Dispense Refill    atorvastatin (LIPITOR) 20 MG tablet   3    citalopram (CELEXA) 20 MG tablet Take 20 mg by mouth once daily.  3    donepeziL (ARICEPT) 5 MG tablet Take 5 mg by mouth every evening.      latanoprost 0.005 % ophthalmic solution INSTILL 1 DROP INTO EACH EYE AT BEDTIME  3    levothyroxine (SYNTHROID) 25 MCG tablet Take 25 mcg by mouth once daily.  2    telmisartan (MICARDIS) 40 MG Tab   3    [DISCONTINUED] carbidopa-levodopa  mg (SINEMET)  mg per tablet Take 1 tablet by mouth 3 (three) times daily.      carbidopa-levodopa  mg (SINEMET)  mg per tablet Take 1.5 tablets by mouth 3 (three) times daily. 90 tablet 12    mirtazapine (REMERON) 7.5 MG Tab Take 1 tablet (7.5 mg total) by mouth every evening. 30 tablet 11     No facility-administered encounter medications on file as of 12/19/2024.       Past Medical History:  Patient Active Problem List   Diagnosis    Ingrown nail    Paronychia of great toe of right foot    Shuffling gait    Impaired functional mobility, balance, gait, and endurance    Parkinson's disease without dyskinesia, with fluctuating manifestations    Mood change       Past Surgical History:  No past surgical history on file.    Social:  Social History     Socioeconomic History    Marital status:    Tobacco Use    Smoking status: Never    Smokeless tobacco: Never   Substance and Sexual Activity    Alcohol use: Not Currently    Drug use: Never    Sexual activity: Yes     Partners: Female     Social Drivers of Health     Financial Resource Strain: Low Risk  (8/23/2024)    Overall Financial Resource Strain (CARDIA)     Difficulty of Paying Living Expenses: Not very hard   Food Insecurity: No Food Insecurity (8/23/2024)    Hunger Vital Sign     Worried About  "Running Out of Food in the Last Year: Never true     Ran Out of Food in the Last Year: Never true   Physical Activity: Inactive (8/23/2024)    Exercise Vital Sign     Days of Exercise per Week: 0 days     Minutes of Exercise per Session: 0 min   Stress: No Stress Concern Present (8/23/2024)    [employer] - Occupational Stress Questionnaire     Feeling of Stress : Only a little   Housing Stability: Unknown (8/23/2024)    Housing Stability Vital Sign     Unable to Pay for Housing in the Last Year: No       Family History:  No family history on file.    PHYSICAL:  BP (!) 173/85 (BP Location: Right arm, Patient Position: Sitting)   Pulse 73   Ht 5' 10" (1.778 m)   Wt 94.1 kg (207 lb 7.3 oz)   BMI 29.77 kg/m²     Physical Exam  Constitutional: Well-developed, well-nourished, appears stated age  Eyes: No scleral icterus  ENT: Moist oral mucosa  Cardiovascular: No lower extremity edema   Respiratory: No labored breathing   Skin: No rash   Hematologic: No bruising  Other: GI/ deferred   Mental status: Alert and oriented to person, place, time, and situation;   Speech: normal (not dysarthric), no aphasia  Cranial nerves:            CN II: Pupils mid-position and equal, not tested light or accommodation  CN III, IV, VI: Extraocular movements full, no nystagmus visualized  CN V: Not tested   CN VII: Face strong and symmetric bilaterally   CN VIII: Hearing intact to voice and conversation   CN IX, X: Palate raises midline and symmetric   CN XI: Strong shoulder shrug B/L  CN XII: Tongue appears midline   Motor: Normal bulk by appearance, no drift   Sensory: Not tested    Gait: Not tested  Deep tendon reflexes: Not tested  Movement/Coordination                    No hypophonic speech.                     No facial masking.  No bradykinesia.                  Mild hypomimia  Does not swing R shoulder      Bradykinesia  ? Finger taps Finger flicks ALE Heel taps   Left 0 0 0 0   Right 1+ 0 0 0 "     Tone   1+LUE  1+RUE    RLE shuffling gait  4 step turns    No tremor      Laboratory Data:  NA    Imaging:  DATscan unavailable    MRI brain shows mild atrophy per my read      Assessment//Plan:   Problem List Items Addressed This Visit          Neuro    Parkinson's disease without dyskinesia, with fluctuating manifestations - Primary    Current Assessment & Plan     Typical IPD  R>L rigidity    He's on carbidopa/levodopa 25/100mg 1 tab PO TID wand struggling with RLE and RUE rigidity  Try carbidopa/levodopa 25/100mg 1.5 tab PO TID             Psychiatric    Mood change    Current Assessment & Plan     Suggested add remeron 7.5mg QHS for mood lability and better sleep              Lluvia Rodríguez MD, MS  [facility]  Department of Neurology  Movement Disorders

## 2024-12-19 NOTE — ASSESSMENT & PLAN NOTE
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

## 2025-03-07 ENCOUNTER — TELEPHONE (OUTPATIENT)
Facility: CLINIC | Age: 80
End: 2025-03-07
Payer: MEDICARE

## 2025-03-07 NOTE — TELEPHONE ENCOUNTER
Called PT today to booked them for a virtual visit in April. PT picks up. And has been schedule for April 21st. PT has informed that if the appt time and date do not work out he will give us a call. I gave pt our office number.

## 2025-04-21 ENCOUNTER — TELEPHONE (OUTPATIENT)
Dept: NEUROLOGY | Facility: CLINIC | Age: 80
End: 2025-04-21
Payer: MEDICARE

## 2025-04-21 ENCOUNTER — OFFICE VISIT (OUTPATIENT)
Facility: CLINIC | Age: 80
End: 2025-04-21
Payer: MEDICARE

## 2025-04-21 DIAGNOSIS — R45.86 MOOD CHANGE: Primary | ICD-10-CM

## 2025-04-21 DIAGNOSIS — G20.A2 PARKINSON'S DISEASE WITHOUT DYSKINESIA, WITH FLUCTUATING MANIFESTATIONS: ICD-10-CM

## 2025-04-21 RX ORDER — CARBIDOPA AND LEVODOPA 25; 100 MG/1; MG/1
2 TABLET ORAL 3 TIMES DAILY
Qty: 90 TABLET | Refills: 12 | Status: SHIPPED | OUTPATIENT
Start: 2025-04-21

## 2025-04-21 RX ORDER — MIRTAZAPINE 15 MG/1
15 TABLET, FILM COATED ORAL NIGHTLY
Qty: 30 TABLET | Refills: 11 | Status: SHIPPED | OUTPATIENT
Start: 2025-04-21 | End: 2026-04-21

## 2025-04-21 NOTE — ASSESSMENT & PLAN NOTE
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

## 2025-04-21 NOTE — PROGRESS NOTES
The patient location is: HOME  The chief complaint leading to visit is: iPD  1. Mood change        2. Parkinson's disease without dyskinesia, with fluctuating manifestations          Visit type: Virtual visit with synchronous audio and video    Face to Face time with patient: 20mins  20 minutes of total time spent on the encounter, which includes face to face time and non-face to face time preparing to see the patient (eg, review of tests), Obtaining and/or reviewing separately obtained history, Documenting clinical information in the electronic or other health record, Independently interpreting results (not separately reported) and communicating results to the patient/family/caregiver, or Care coordination (not separately reported).     Each patient to whom he or she provides medical services by telemedicine is:  (1) informed of the relationship between the physician and patient and the respective role of any other health care provider with respect to management of the patient; and (2) notified that he or she may decline to receive medical services by telemedicine and may withdraw from such care at any time.        MOVEMENT DISORDERS CLINIC    PCP/Referring Provider: No referring provider defined for this encounter.  Date of Service: 4/21/2025    Chief Complaint: iPD    Interval Hx    Since last visit,   Some issues walking  Festinating at times    Continues on carbidopa/levodopa 25/100mg 1.5 tab PO TID   8am/1pm/6pm    Accidentally took 2 and felt even better    Quick to anger at times  Taking donepezil 5mg QHS    Taking mirtazapine 7.5mg QHS  Still having events when get frustrated easily      PD Review of Symptoms:  Anosmia: yes  Dysarthria/Hypophonia: none  Dysphagia/Sialorrhea: none  Depression: yes - labile  Cognitive slowing: word winding  Hallucinations: none  Impulsivity: yes  Urinary changes: none  Constipation: none  Orthostasis: none  Falls: none  Micrographia: yes  Sleep issues:  -RBD:  "none    "priorHPI: Curtis Gross is a R HANDED 80 y.o. male with a medical issues significant for HTN, HL, Thyroid dz, ankle and b/l shoulder surgery, dz with ipD based on a POS datscan and change in gait after a covid booster over a year ago. He first felt imbalance that got better over time. He notices he shuffles but does not specifically drag one foot.     Had a DATScan but was on citalopram  Started carbidopa/levodopa 25/100mg 1 tab PO TID and gait somewhat better    Never had a tremor.    Wife is in PT"    Neuroleptic exposure:  None    Current Living Situation: home    PD Review of Symptoms:  Anosmia: yes  Dysarthria/Hypophonia: none  Dysphagia/Sialorrhea: none  Depression: yes - labile  Cognitive slowing: word winding  Hallucinations: none  Impulsivity: yes  Urinary changes: none  Constipation: none  Orthostasis: none  Falls: none  Micrographia: yes  Sleep issues:  -RBD: none    Review of Systems:   Review of Systems   Constitutional:  Negative for fever.   HENT:  Negative for congestion.    Eyes:  Negative for double vision.   Respiratory:  Negative for cough and shortness of breath.    Cardiovascular:  Negative for chest pain and leg swelling.   Gastrointestinal:  Negative for nausea.   Genitourinary:  Negative for dysuria.   Musculoskeletal:  Positive for falls.   Skin:  Negative for rash.   Neurological:  Positive for tremors and speech change. Negative for headaches.   Psychiatric/Behavioral:  Negative for depression.          Current Medications:  Outpatient Encounter Medications as of 4/21/2025   Medication Sig Dispense Refill    atorvastatin (LIPITOR) 20 MG tablet   3    carbidopa-levodopa  mg (SINEMET)  mg per tablet Take 1.5 tablets by mouth 3 (three) times daily. 90 tablet 12    citalopram (CELEXA) 20 MG tablet Take 20 mg by mouth once daily.  3    donepeziL (ARICEPT) 5 MG tablet Take 5 mg by mouth every evening.      latanoprost 0.005 % ophthalmic solution INSTILL 1 DROP INTO EACH " EYE AT BEDTIME  3    levothyroxine (SYNTHROID) 25 MCG tablet Take 25 mcg by mouth once daily.  2    mirtazapine (REMERON) 7.5 MG Tab Take 1 tablet (7.5 mg total) by mouth every evening. 30 tablet 11    telmisartan (MICARDIS) 40 MG Tab   3     No facility-administered encounter medications on file as of 4/21/2025.       Past Medical History:  Patient Active Problem List   Diagnosis    Ingrown nail    Paronychia of great toe of right foot    Shuffling gait    Impaired functional mobility, balance, gait, and endurance    Parkinson's disease without dyskinesia, with fluctuating manifestations    Mood change       Past Surgical History:  No past surgical history on file.    Social:  Social History     Socioeconomic History    Marital status:    Tobacco Use    Smoking status: Never    Smokeless tobacco: Never   Substance and Sexual Activity    Alcohol use: Not Currently    Drug use: Never    Sexual activity: Yes     Partners: Female     Social Drivers of Health     Financial Resource Strain: Low Risk  (4/14/2025)    Overall Financial Resource Strain (CARDIA)     Difficulty of Paying Living Expenses: Not very hard   Food Insecurity: No Food Insecurity (4/14/2025)    Hunger Vital Sign     Worried About Running Out of Food in the Last Year: Never true     Ran Out of Food in the Last Year: Never true   Transportation Needs: No Transportation Needs (4/14/2025)    PRAPARE - Transportation     Lack of Transportation (Medical): No     Lack of Transportation (Non-Medical): No   Physical Activity: Inactive (4/14/2025)    Exercise Vital Sign     Days of Exercise per Week: 0 days     Minutes of Exercise per Session: 0 min   Stress: No Stress Concern Present (4/14/2025)    [employer] - Occupational Stress Questionnaire     Feeling of Stress : Only a little   Housing Stability: Low Risk  (4/14/2025)    Housing Stability Vital Sign     Unable to Pay for Housing in the Last Year: No     Number of  Times Moved in the Last Year: 0     Homeless in the Last Year: No       Family History:  No family history on file.    PHYSICAL:  There were no vitals taken for this visit.    Physical Exam  Constitutional: Well-developed, well-nourished, appears stated age  Eyes: No scleral icterus  ENT: Moist oral mucosa  Cardiovascular: No lower extremity edema   Respiratory: No labored breathing   Skin: No rash   Hematologic: No bruising  Other: GI/ deferred   Mental status: Alert and oriented to person, place, time, and situation;   Speech: normal (not dysarthric), no aphasia  Cranial nerves:            CN II: Pupils mid-position and equal, not tested light or accommodation  CN III, IV, VI: Extraocular movements full, no nystagmus visualized  CN V: Not tested   CN VII: Face strong and symmetric bilaterally   CN VIII: Hearing intact to voice and conversation   CN IX, X: Palate raises midline and symmetric   CN XI: Strong shoulder shrug B/L  CN XII: Tongue appears midline   Motor: Normal bulk by appearance, no drift   Sensory: Not tested    Gait: Not tested  Deep tendon reflexes: Not tested  Movement/Coordination                    No hypophonic speech.                     No facial masking.  No bradykinesia.                  Mild hypomimia  Does not swing R shoulder      Bradykinesia  ? Finger taps Finger flicks ALE Heel taps   Left 1+ 0 0 0   Right 2+ 0 0 0     Tone   1+LUE  1+RUE    RLE shuffling gait  4 step turns    No tremor      Laboratory Data:  NA    Imaging:  DATscan unavailable    MRI brain shows mild atrophy per my read      Assessment//Plan:   Problem List Items Addressed This Visit          Neuro    Parkinson's disease without dyskinesia, with fluctuating manifestations    Current Assessment & Plan   Typical IPD  R>L rigidity  Mildly underdosed- struggling with festination    He's on carbidopa/levodopa 25/100mg 1.5 tab PO TID wand struggling with RLE and RUE rigidity  Try carbidopa/levodopa 25/100mg 2 tab PO TID              Psychiatric    Mood change - Primary    Current Assessment & Plan   Suggested increase remeron 15g QHS for mood lability and better sleep            This visit covered ongoing complex medical needs extending over multiple office visits covering multiple chronic issues.    Lluvia Rodríguez MD, MS  [facility]  Department of Neurology  Movement Disorders

## 2025-04-21 NOTE — TELEPHONE ENCOUNTER
Called patient because patient got disconnect on virtual visit. Called patient to assist with logging back into virtual visit. Phone disconnected and patient logged back into the virtual visit successfully per Dr. Rodríguez

## 2025-05-16 ENCOUNTER — PATIENT MESSAGE (OUTPATIENT)
Facility: CLINIC | Age: 80
End: 2025-05-16
Payer: MEDICARE

## 2025-05-19 ENCOUNTER — PATIENT MESSAGE (OUTPATIENT)
Facility: CLINIC | Age: 80
End: 2025-05-19
Payer: MEDICARE

## 2025-05-19 DIAGNOSIS — R26.89 IMBALANCE: Primary | ICD-10-CM

## 2025-05-19 DIAGNOSIS — G60.9 NEUROPATHY, IDIOPATHIC: ICD-10-CM

## 2025-05-20 ENCOUNTER — TELEPHONE (OUTPATIENT)
Facility: CLINIC | Age: 80
End: 2025-05-20
Payer: MEDICARE

## 2025-05-20 NOTE — TELEPHONE ENCOUNTER
Called to see how pt is taking his carbidopa levadopa    LOV in April. Was told to increase to two tabs three times per day and start Remeron. Pt has done this.    He is stumbling with increased problems with balance and gait.      Recent med changes have not made any difference. Going PT.     Informed pt's wife of labs ordered recently. She stated she would go and get them done. Emailed order to her to facilitate getting labs. Advised going to an [facility] lab. She verbalized understanding.

## 2025-05-22 ENCOUNTER — TELEPHONE (OUTPATIENT)
Facility: CLINIC | Age: 80
End: 2025-05-22
Payer: MEDICARE

## 2025-05-22 ENCOUNTER — LAB VISIT (OUTPATIENT)
Dept: LAB | Facility: HOSPITAL | Age: 80
End: 2025-05-22
Attending: PSYCHIATRY & NEUROLOGY
Payer: MEDICARE

## 2025-05-22 DIAGNOSIS — G60.9 NEUROPATHY, IDIOPATHIC: ICD-10-CM

## 2025-05-22 DIAGNOSIS — R26.89 IMBALANCE: ICD-10-CM

## 2025-05-22 LAB
FOLATE SERPL-MCNC: 16.8 NG/ML (ref 4–24)
TSH SERPL-ACNC: 1.63 UIU/ML (ref 0.4–4)

## 2025-05-22 PROCEDURE — 82746 ASSAY OF FOLIC ACID SERUM: CPT

## 2025-05-22 PROCEDURE — 36415 COLL VENOUS BLD VENIPUNCTURE: CPT

## 2025-05-22 PROCEDURE — 84425 ASSAY OF VITAMIN B-1: CPT

## 2025-05-22 PROCEDURE — 84443 ASSAY THYROID STIM HORMONE: CPT

## 2025-05-22 PROCEDURE — 84207 ASSAY OF VITAMIN B-6: CPT

## 2025-05-22 NOTE — TELEPHONE ENCOUNTER
Called patient to offer him the next available virtual visit with Dr. Rodríguez as requested by Dr. Rodríguez. Patient confirmed appt time and date.

## 2025-05-24 LAB — VIT B12 SERPL-MCNC: 628 NG/L (ref 180–914)

## 2025-05-28 LAB — W VITAMIN B6: 9 UG/L

## 2025-05-29 LAB — W VITAMIN B1: 69 UG/L

## 2025-06-09 ENCOUNTER — OFFICE VISIT (OUTPATIENT)
Dept: PODIATRY | Facility: CLINIC | Age: 80
End: 2025-06-09
Payer: MEDICARE

## 2025-06-09 VITALS
HEART RATE: 70 BPM | BODY MASS INDEX: 29.7 KG/M2 | SYSTOLIC BLOOD PRESSURE: 149 MMHG | HEIGHT: 70 IN | WEIGHT: 207.44 LBS | DIASTOLIC BLOOD PRESSURE: 80 MMHG

## 2025-06-09 DIAGNOSIS — L03.031 PARONYCHIA OF GREAT TOE OF RIGHT FOOT: ICD-10-CM

## 2025-06-09 DIAGNOSIS — G20.A2 PARKINSON'S DISEASE WITHOUT DYSKINESIA, WITH FLUCTUATING MANIFESTATIONS: ICD-10-CM

## 2025-06-09 DIAGNOSIS — L60.0 INGROWN NAIL: Primary | ICD-10-CM

## 2025-06-09 PROCEDURE — 99203 OFFICE O/P NEW LOW 30 MIN: CPT | Mod: S$PBB,,, | Performed by: PODIATRIST

## 2025-06-09 PROCEDURE — 99999 PR PBB SHADOW E&M-EST. PATIENT-LVL III: CPT | Mod: PBBFAC,,, | Performed by: PODIATRIST

## 2025-06-09 PROCEDURE — 99213 OFFICE O/P EST LOW 20 MIN: CPT | Mod: PBBFAC | Performed by: PODIATRIST

## 2025-06-10 NOTE — PROGRESS NOTES
Subjective:       Patient ID: Curtis Gross is a 80 y.o. male.    Chief Complaint: Ingrown Toenail    HPI patient presents today with a complaint of an infected ingrown toenail on his right great toe he states he thinks it may have been caused by some closed in shoes that he wore the put pressure on the area.  Patient states he has had ingrown toenails in the past however he has been able to trim them himself.  Review of Systems   All other systems reviewed and are negative.      Objective:      Physical Exam  Vitals and nursing note reviewed.   Constitutional:       Appearance: He is well-developed.   Cardiovascular:      Pulses:           Dorsalis pedis pulses are 1+ on the right side and 1+ on the left side.        Posterior tibial pulses are 1+ on the right side and 1+ on the left side.   Pulmonary:      Effort: Pulmonary effort is normal.   Musculoskeletal:         General: Normal range of motion.        Feet:    Feet:      Right foot:      Protective Sensation: 4 sites tested.  4 sites sensed.      Skin integrity: Erythema and warmth present.      Toenail Condition: Right toenails are ingrown.      Left foot:      Protective Sensation: 4 sites tested.  4 sites sensed.   Skin:     General: Skin is warm.      Capillary Refill: Capillary refill takes 2 to 3 seconds.      Findings: Erythema present.   Neurological:      Mental Status: He is alert.   Psychiatric:         Behavior: Behavior normal.         Thought Content: Thought content normal.         Judgment: Judgment normal.                   Assessment:       1. Ingrown nail    2. Paronychia of great toe of right foot    3. Parkinson's disease without dyskinesia, with fluctuating manifestations        Plan:       Patient presents today for new patient evaluation complete new patient exam performed patient's complaint is an ingrown toenail on his right great toe he has had this from time to time but he is easily been able to trim it he states this time it has  become much worse he believes that was caused by a closed in shoe that he wore.  Patient has positive erythema positive edema Laterall border right hallux additionally patient has signs of fungal involvement in the nail this is likely a contributing factor to the ingrown toenail and infection that he has today.  I was able to aggressively debride and remove the ingrowing nail from both the medial & lateral border of the right hallux patient noted immediate relief I have recommended application of Vicks vapor rub twice a day every day this will soften the nail and address the fungal infection make it easier to trim I have advised the patient he needs to keep the corners of these nails trimmed out properly to prevent this again in the future.  Patient advised I would recommend not doing the Vicks vapor rub for the next week I did apply bacitracin ointment and a light dressing today.  The last time I saw the patient for a similar problem was 6 years ago he states he has absolutely fine if he only has to come in once every 6 years to have the ingrown toenail removed.  Patient advised if he is not completely pain-free over the next 4-5 days to contact me for follow-up further evaluation and treatment I will see the patient as needed for follow-up.

## 2025-06-23 ENCOUNTER — TELEPHONE (OUTPATIENT)
Facility: CLINIC | Age: 80
End: 2025-06-23
Payer: MEDICARE

## 2025-06-23 NOTE — TELEPHONE ENCOUNTER
Called patient to reschedule virtual appt with Dr. Rodríguez. Patient had to cancel due to being in the hospital. Patient has been scheduled a month out from now fue to not knowing when he will be out of the hospital.

## 2025-06-23 NOTE — TELEPHONE ENCOUNTER
"Copied from CRM #[id number]. Topic: General Inquiry - Patient Advice  >> Jun 23, 2025  8:31 AM Joana wrote:  .Name Of Caller: Marisel/wife      Contact Preference?:[phone]     What is the nature of the call?:in reference to pt wont be able to make appt 6/23/25 today due to being in the hospital,p ls soh      Additional Notes:  "Thank you for all that you do for our patients"  "

## 2025-07-17 ENCOUNTER — TELEPHONE (OUTPATIENT)
Facility: CLINIC | Age: 80
End: 2025-07-17
Payer: MEDICARE

## 2025-07-17 NOTE — TELEPHONE ENCOUNTER
Copied from CRM #[id number]. Topic: Appointments - Appointment Access  >> Jul 16, 2025  3:45 PM Jacy wrote:  Patient's wife Marisel calling to reschedule appointment due to checkup. Please contact patient as soon as possible.    Routed to MA to assist with scheduling.

## 2025-07-28 ENCOUNTER — TELEPHONE (OUTPATIENT)
Facility: CLINIC | Age: 80
End: 2025-07-28

## 2025-07-28 ENCOUNTER — OFFICE VISIT (OUTPATIENT)
Facility: CLINIC | Age: 80
End: 2025-07-28
Payer: MEDICARE

## 2025-07-28 DIAGNOSIS — G20.A2 PARKINSON'S DISEASE WITHOUT DYSKINESIA, WITH FLUCTUATING MANIFESTATIONS: Primary | ICD-10-CM

## 2025-07-28 DIAGNOSIS — R45.86 MOOD CHANGE: ICD-10-CM

## 2025-07-28 PROCEDURE — 98005 SYNCH AUDIO-VIDEO EST LOW 20: CPT | Mod: 95,,, | Performed by: PSYCHIATRY & NEUROLOGY

## 2025-07-28 PROCEDURE — G2211 COMPLEX E/M VISIT ADD ON: HCPCS | Mod: 95,,, | Performed by: PSYCHIATRY & NEUROLOGY

## 2025-07-28 RX ORDER — CARBIDOPA AND LEVODOPA 25; 100 MG/1; MG/1
2 TABLET ORAL 4 TIMES DAILY
Qty: 90 TABLET | Refills: 12 | Status: SHIPPED | OUTPATIENT
Start: 2025-07-28

## 2025-07-28 NOTE — PROGRESS NOTES
The patient location is: HOME  The chief complaint leading to visit is: iPD  1. Parkinson's disease without dyskinesia, with fluctuating manifestations        2. Mood change            Visit type: Virtual visit with synchronous audio and video    Face to Face time with patient: 20mins  20 minutes of total time spent on the encounter, which includes face to face time and non-face to face time preparing to see the patient (eg, review of tests), Obtaining and/or reviewing separately obtained history, Documenting clinical information in the electronic or other health record, Independently interpreting results (not separately reported) and communicating results to the patient/family/caregiver, or Care coordination (not separately reported).     Each patient to whom he or she provides medical services by telemedicine is:  (1) informed of the relationship between the physician and patient and the respective role of any other health care provider with respect to management of the patient; and (2) notified that he or she may decline to receive medical services by telemedicine and may withdraw from such care at any time.      MOVEMENT DISORDERS CLINIC    PCP/Referring Provider: No referring provider defined for this encounter.  Date of Service: 7/28/2025    Chief Complaint: iPD    Interval Hx    Since last visit,   Found down and unconscious on ground  Found to have cardiac arrhythmias  Tilt table was neg per wife's report  Stopped donepezil - unclear why  Was taking THC based medications    Continues on carbidopa/levodopa 25/100mg 2 tab PO TID   8am/2pm/9pm  Ontime is 6 hours    Some issues walking  Festinating at times    Accidentally took 2 and felt even better    Taking mirtazapine 15mg QHS - less easily frustrated    PD Review of Symptoms:  Anosmia: yes  Dysarthria/Hypophonia: none  Dysphagia/Sialorrhea: none  Depression: yes - labile  Cognitive slowing: word winding  Hallucinations: none  Impulsivity: yes  Urinary  "changes: none  Constipation: none  Orthostasis: none  Falls: none  Micrographia: yes  Sleep issues:  -RBD: none    "priorHPI: Curtis Gross is a R HANDED 80 y.o. male with a medical issues significant for HTN, HL, Thyroid dz, ankle and b/l shoulder surgery, dz with ipD based on a POS datscan and change in gait after a covid booster over a year ago. He first felt imbalance that got better over time. He notices he shuffles but does not specifically drag one foot.     Had a DATScan but was on citalopram  Started carbidopa/levodopa 25/100mg 1 tab PO TID and gait somewhat better    Never had a tremor.    Wife is in PT"    Neuroleptic exposure:  None    Current Living Situation: home    PD Review of Symptoms:  Anosmia: yes  Dysarthria/Hypophonia: none  Dysphagia/Sialorrhea: none  Depression: yes - labile  Cognitive slowing: word winding  Hallucinations: none  Impulsivity: yes  Urinary changes: none  Constipation: none  Orthostasis: none  Falls: none  Micrographia: yes  Sleep issues:  -RBD: none    Review of Systems:   Review of Systems   Constitutional:  Negative for fever.   HENT:  Negative for congestion.    Eyes:  Negative for double vision.   Respiratory:  Negative for cough and shortness of breath.    Cardiovascular:  Negative for chest pain and leg swelling.   Gastrointestinal:  Negative for nausea.   Genitourinary:  Negative for dysuria.   Musculoskeletal:  Positive for falls.   Skin:  Negative for rash.   Neurological:  Positive for tremors and speech change. Negative for headaches.   Psychiatric/Behavioral:  Negative for depression.          Current Medications:  Outpatient Encounter Medications as of 7/28/2025   Medication Sig Dispense Refill    atorvastatin (LIPITOR) 20 MG tablet   3    carbidopa-levodopa  mg (SINEMET)  mg per tablet Take 2 tablets by mouth 3 (three) times daily. 90 tablet 12    citalopram (CELEXA) 20 MG tablet Take 20 mg by mouth once daily.  3    latanoprost 0.005 % ophthalmic " solution INSTILL 1 DROP INTO EACH EYE AT BEDTIME  3    levothyroxine (SYNTHROID) 25 MCG tablet Take 25 mcg by mouth once daily.  2    mirtazapine (REMERON) 15 MG tablet Take 1 tablet (15 mg total) by mouth every evening. 30 tablet 11    telmisartan (MICARDIS) 40 MG Tab   3    [DISCONTINUED] donepeziL (ARICEPT) 5 MG tablet Take 5 mg by mouth every evening.       No facility-administered encounter medications on file as of 7/28/2025.       Past Medical History:  Patient Active Problem List   Diagnosis    Ingrown nail    Paronychia of great toe of right foot    Shuffling gait    Impaired functional mobility, balance, gait, and endurance    Parkinson's disease without dyskinesia, with fluctuating manifestations    Mood change       Past Surgical History:  No past surgical history on file.    Social:  Social History     Socioeconomic History    Marital status:    Tobacco Use    Smoking status: Never    Smokeless tobacco: Never   Substance and Sexual Activity    Alcohol use: Not Currently    Drug use: Never    Sexual activity: Yes     Partners: Female     Social Drivers of Health     Financial Resource Strain: Low Risk  (4/14/2025)    Overall Financial Resource Strain (CARDIA)     Difficulty of Paying Living Expenses: Not very hard   Food Insecurity: No Food Insecurity (4/14/2025)    Hunger Vital Sign     Worried About Running Out of Food in the Last Year: Never true     Ran Out of Food in the Last Year: Never true   Transportation Needs: No Transportation Needs (4/14/2025)    PRAPARE - Transportation     Lack of Transportation (Medical): No     Lack of Transportation (Non-Medical): No   Physical Activity: Inactive (4/14/2025)    Exercise Vital Sign     Days of Exercise per Week: 0 days     Minutes of Exercise per Session: 0 min   Stress: No Stress Concern Present (4/14/2025)    [employer] - Occupational Stress Questionnaire     Feeling of Stress : Only a little   Housing Stability:  Low Risk  (4/14/2025)    Housing Stability Vital Sign     Unable to Pay for Housing in the Last Year: No     Number of Times Moved in the Last Year: 0     Homeless in the Last Year: No       Family History:  No family history on file.    PHYSICAL:  There were no vitals taken for this visit.    Physical Exam  Constitutional: Well-developed, well-nourished, appears stated age  Eyes: No scleral icterus  ENT: Moist oral mucosa  Cardiovascular: No lower extremity edema   Respiratory: No labored breathing   Skin: No rash   Hematologic: No bruising  Other: GI/ deferred   Mental status: Alert and oriented to person, place, time, and situation;   Speech: normal (not dysarthric), no aphasia  Cranial nerves:            CN II: Pupils mid-position and equal, not tested light or accommodation  CN III, IV, VI: Extraocular movements full, no nystagmus visualized  CN V: Not tested   CN VII: Face strong and symmetric bilaterally   CN VIII: Hearing intact to voice and conversation   CN IX, X: Palate raises midline and symmetric   CN XI: Strong shoulder shrug B/L  CN XII: Tongue appears midline   Motor: Normal bulk by appearance, no drift   Sensory: Not tested    Gait: moderate brdykinesia  Deep tendon reflexes: Not tested  Movement/Coordination                    No hypophonic speech.                     No facial masking.  No bradykinesia.                  Mild hypomimia  Does not swing R shoulder      Bradykinesia  ? Finger taps Finger flicks ALE Heel taps   Left 1+ 0 0 0   Right 2+ 0 0 0     Tone   1+LUE  1+RUE    RLE shuffling gait  4 step turns    No tremor      Laboratory Data:  NA    Imaging:  DATscan unavailable    MRI brain shows mild atrophy per my read      Assessment//Plan:   Problem List Items Addressed This Visit          Neuro    Parkinson's disease without dyskinesia, with fluctuating manifestations - Primary    Current Assessment & Plan   Typical IPD  R>L rigidity  Mildly underdosed- struggling with festination  when he wears off  [place] ongoing PT    Continues on carbidopa/levodopa 25/100mg 2 tab PO TID   Ontime is 6H  Suggsted 5.5 hours between pills increasing dosing to QID    Off donepezil now- unclear why            Psychiatric    Mood change    Current Assessment & Plan   Continue remeron 15g QHS for mood lability and better sleep            This visit covered ongoing complex medical needs extending over multiple office visits covering multiple chronic issues.    Lluvia Rodríguez MD, MS  [facility]  Department of Neurology  Movement Disorders

## 2025-07-28 NOTE — ASSESSMENT & PLAN NOTE
Typical IPD  R>L rigidity  Mildly underdosed- struggling with festination when he wears off  [place] ongoing PT    Continues on carbidopa/levodopa 25/100mg 2 tab PO TID   Ontime is 6H  Suggsted 5.5 hours between pills increasing dosing to QID    Off donepezil now- unclear why